# Patient Record
Sex: MALE | Race: WHITE | URBAN - METROPOLITAN AREA
[De-identification: names, ages, dates, MRNs, and addresses within clinical notes are randomized per-mention and may not be internally consistent; named-entity substitution may affect disease eponyms.]

---

## 2017-01-31 ENCOUNTER — CLINICAL DOCUMENTATION (OUTPATIENT)
Dept: FAMILY MEDICINE CLINIC | Age: 47
End: 2017-01-31

## 2017-01-31 RX ORDER — LORAZEPAM 0.5 MG/1
TABLET ORAL
Qty: 30 TABLET | Refills: 0 | Status: SHIPPED | OUTPATIENT
Start: 2017-01-31 | End: 2017-07-24 | Stop reason: SDUPTHER

## 2017-01-31 RX ORDER — RISPERIDONE 0.5 MG/1
TABLET, FILM COATED ORAL
Qty: 180 TABLET | Refills: 0 | Status: SHIPPED | OUTPATIENT
Start: 2017-01-31 | End: 2017-08-06 | Stop reason: SDUPTHER

## 2017-02-07 RX ORDER — TAMSULOSIN HYDROCHLORIDE 0.4 MG/1
0.4 CAPSULE ORAL DAILY
Qty: 30 CAPSULE | Refills: 3 | Status: SHIPPED | OUTPATIENT
Start: 2017-02-07 | End: 2017-08-31

## 2017-07-25 RX ORDER — LORAZEPAM 0.5 MG/1
0.5 TABLET ORAL DAILY PRN
Qty: 30 TABLET | Refills: 0 | Status: SHIPPED | OUTPATIENT
Start: 2017-07-25 | End: 2020-03-17

## 2017-08-08 RX ORDER — RISPERIDONE 0.5 MG/1
TABLET, FILM COATED ORAL
Qty: 180 TABLET | Refills: 0 | Status: SHIPPED | OUTPATIENT
Start: 2017-08-08 | End: 2018-01-08 | Stop reason: SDUPTHER

## 2017-08-31 ENCOUNTER — OFFICE VISIT (OUTPATIENT)
Dept: FAMILY MEDICINE CLINIC | Age: 47
End: 2017-08-31

## 2017-08-31 VITALS — HEART RATE: 80 BPM | SYSTOLIC BLOOD PRESSURE: 132 MMHG | TEMPERATURE: 98 F | DIASTOLIC BLOOD PRESSURE: 84 MMHG

## 2017-08-31 DIAGNOSIS — E03.9 ACQUIRED HYPOTHYROIDISM: ICD-10-CM

## 2017-08-31 DIAGNOSIS — R35.0 URINE FREQUENCY: ICD-10-CM

## 2017-08-31 DIAGNOSIS — E03.9 ACQUIRED HYPOTHYROIDISM: Primary | ICD-10-CM

## 2017-08-31 LAB
A/G RATIO: 1.6 (ref 1.1–2.2)
ALBUMIN SERPL-MCNC: 4.4 G/DL (ref 3.4–5)
ALP BLD-CCNC: 76 U/L (ref 40–129)
ALT SERPL-CCNC: 22 U/L (ref 10–40)
ANION GAP SERPL CALCULATED.3IONS-SCNC: 15 MMOL/L (ref 3–16)
AST SERPL-CCNC: 16 U/L (ref 15–37)
BILIRUB SERPL-MCNC: 0.4 MG/DL (ref 0–1)
BILIRUBIN URINE: NEGATIVE
BLOOD, URINE: NEGATIVE
BUN BLDV-MCNC: 11 MG/DL (ref 7–20)
CALCIUM SERPL-MCNC: 9.8 MG/DL (ref 8.3–10.6)
CHLORIDE BLD-SCNC: 101 MMOL/L (ref 99–110)
CLARITY: CLEAR
CO2: 24 MMOL/L (ref 21–32)
COLOR: YELLOW
CREAT SERPL-MCNC: 0.7 MG/DL (ref 0.9–1.3)
EPITHELIAL CELLS, UA: 1 /HPF (ref 0–5)
GFR AFRICAN AMERICAN: >60
GFR NON-AFRICAN AMERICAN: >60
GLOBULIN: 2.8 G/DL
GLUCOSE BLD-MCNC: 90 MG/DL (ref 70–99)
GLUCOSE URINE: NEGATIVE MG/DL
HCT VFR BLD CALC: 47.4 % (ref 40.5–52.5)
HEMOGLOBIN: 16.2 G/DL (ref 13.5–17.5)
HYALINE CASTS: 1 /LPF (ref 0–8)
KETONES, URINE: NEGATIVE MG/DL
LEUKOCYTE ESTERASE, URINE: NEGATIVE
MCH RBC QN AUTO: 29.7 PG (ref 26–34)
MCHC RBC AUTO-ENTMCNC: 34.1 G/DL (ref 31–36)
MCV RBC AUTO: 87.1 FL (ref 80–100)
MICROSCOPIC EXAMINATION: ABNORMAL
NITRITE, URINE: NEGATIVE
OTHER OBSERVATIONS UA: ABNORMAL
PDW BLD-RTO: 13.7 % (ref 12.4–15.4)
PH UA: 6
PLATELET # BLD: 190 K/UL (ref 135–450)
PMV BLD AUTO: 9.2 FL (ref 5–10.5)
POTASSIUM SERPL-SCNC: 4.4 MMOL/L (ref 3.5–5.1)
PROTEIN UA: NEGATIVE MG/DL
RBC # BLD: 5.44 M/UL (ref 4.2–5.9)
RBC UA: 3 /HPF (ref 0–4)
SODIUM BLD-SCNC: 140 MMOL/L (ref 136–145)
SPECIFIC GRAVITY UA: 1.02
T4 FREE: 1.2 NG/DL (ref 0.9–1.8)
TOTAL PROTEIN: 7.2 G/DL (ref 6.4–8.2)
TSH SERPL DL<=0.05 MIU/L-ACNC: 3.07 UIU/ML (ref 0.27–4.2)
UROBILINOGEN, URINE: 0.2 E.U./DL
WBC # BLD: 5.9 K/UL (ref 4–11)
WBC UA: 5 /HPF (ref 0–5)

## 2017-08-31 PROCEDURE — 99213 OFFICE O/P EST LOW 20 MIN: CPT | Performed by: FAMILY MEDICINE

## 2017-08-31 PROCEDURE — 81001 URINALYSIS AUTO W/SCOPE: CPT | Performed by: FAMILY MEDICINE

## 2017-08-31 RX ORDER — TAMSULOSIN HYDROCHLORIDE 0.4 MG/1
0.4 CAPSULE ORAL DAILY
Qty: 30 CAPSULE | Refills: 5 | Status: SHIPPED | OUTPATIENT
Start: 2017-08-31 | End: 2018-03-12 | Stop reason: SDUPTHER

## 2017-08-31 ASSESSMENT — ENCOUNTER SYMPTOMS
ABDOMINAL PAIN: 0
BLOOD IN STOOL: 0
NAUSEA: 0
DIARRHEA: 0
SHORTNESS OF BREATH: 0
VOMITING: 0

## 2017-09-02 LAB — URINE CULTURE, ROUTINE: NORMAL

## 2017-10-02 ENCOUNTER — OFFICE VISIT (OUTPATIENT)
Dept: FAMILY MEDICINE CLINIC | Age: 47
End: 2017-10-02

## 2017-10-02 VITALS — DIASTOLIC BLOOD PRESSURE: 82 MMHG | TEMPERATURE: 97.7 F | SYSTOLIC BLOOD PRESSURE: 116 MMHG

## 2017-10-02 DIAGNOSIS — E78.2 HYPERLIPIDEMIA, MIXED: ICD-10-CM

## 2017-10-02 DIAGNOSIS — R35.0 URINE FREQUENCY: ICD-10-CM

## 2017-10-02 DIAGNOSIS — E03.9 ACQUIRED HYPOTHYROIDISM: Primary | ICD-10-CM

## 2017-10-02 DIAGNOSIS — K13.0 PERLECHE: ICD-10-CM

## 2017-10-02 PROCEDURE — 99213 OFFICE O/P EST LOW 20 MIN: CPT | Performed by: FAMILY MEDICINE

## 2017-10-02 NOTE — PATIENT INSTRUCTIONS
Continue the same treatment for now  See the urology group for the urine problems  See ent for the continued irritation on the corners of the mouth  See me in 2 months

## 2017-10-02 NOTE — PROGRESS NOTES
Subjective:      Patient ID: Mekhi Reynolds is a 56 y.o. male. HPI Comments: Patient presents with:  1 Month Follow-Up    He is on the meds  He is not noting any improvement with the urine frequency  He is not using decongestants    YOB: 1970    Date of Visit:  10/2/2017    No Known Allergies    Current Outpatient Prescriptions:  tamsulosin (FLOMAX) 0.4 MG capsule, Take 1 capsule by mouth daily, Disp: 30 capsule, Rfl: 5  risperiDONE (RISPERDAL) 0.5 MG tablet, TAKE ONE TABLET BY MOUTH TWICE A DAY AS NEEDED, Disp: 180 tablet, Rfl: 0  LORazepam (ATIVAN) 0.5 MG tablet, Take 1 tablet by mouth daily as needed for Anxiety, Disp: 30 tablet, Rfl: 0  docusate sodium (COLACE) 100 MG capsule, Take 1 capsule by mouth 2 times daily as needed for Constipation, Disp: 30 capsule, Rfl: 0  fluticasone (FLONASE) 50 MCG/ACT nasal spray, 1 spray by Nasal route daily, Disp: 16 g, Rfl: 2  levothyroxine (LEVOTHROID) 25 MCG tablet, Take 1 tablet by mouth Daily, Disp: 30 tablet, Rfl: 3    No current facility-administered medications for this visit.       ---------------------------               10/02/17                      1111         ---------------------------   BP:          116/82         Site:       Left Arm        Position:     Sitting        Cuff Size:   Large Adult      Temp:   97.7 °F (36.5 °C)   TempSrc:      Oral         ---------------------------  There is no height or weight on file to calculate BMI. Wt Readings from Last 3 Encounters:  06/03/17 : 190 lb (86.2 kg)  08/13/15 : 185 lb (83.9 kg)  05/07/15 : 185 lb (83.9 kg)    BP Readings from Last 3 Encounters:  10/02/17 : 116/82  08/31/17 : 132/84  06/03/17 : 120/86        Review of Systems    Objective:   Physical Exam   Constitutional: He appears well-developed and well-nourished. No distress. Abdominal: Soft. Bowel sounds are normal. He exhibits no distension and no mass. There is no tenderness. There is no guarding.

## 2017-10-02 NOTE — MR AVS SNAPSHOT
fluticasone (FLONASE) 50 MCG/ACT nasal spray 1 spray by Nasal route daily    levothyroxine (LEVOTHROID) 25 MCG tablet Take 1 tablet by mouth Daily      Allergies           No Known Allergies         Additional Information        Basic Information     Date Of Birth Sex Race Ethnicity Preferred Language    1970 Male White Non-/Non  English      Problem List as of 10/2/2017  Date Reviewed: 6/7/2016                Acquired hypothyroidism    Hyperlipidemia, mixed    Personal history of traumatic brain injury    Allergic rhinitis    Anxiety      Immunizations as of 10/2/2017     Name Date    Influenza Whole 9/16/2014    Pneumococcal Polysaccharide ([id number]) 4/28/2005    Tdap (Boostrix, Adacel) 6/8/2009      Preventive Care        Date Due    HIV screening is recommended for all people regardless of risk factors  aged 15-65 years at least once (lifetime) who have never been HIV tested. 2/18/1986    Yearly Flu Vaccine (1) 9/1/2017    Tetanus Combination Vaccine (2 - Td) 6/8/2019    Cholesterol Screening 11/4/2020            MyChart Signup           Our records indicate that you have declined MyChart signup.

## 2017-12-18 RX ORDER — FLUTICASONE PROPIONATE 50 MCG
SPRAY, SUSPENSION (ML) NASAL
Qty: 16 G | Refills: 5 | Status: SHIPPED | OUTPATIENT
Start: 2017-12-18 | End: 2019-11-17 | Stop reason: SDUPTHER

## 2018-01-08 RX ORDER — RISPERIDONE 0.5 MG/1
TABLET, FILM COATED ORAL
Qty: 180 TABLET | Refills: 0 | Status: SHIPPED | OUTPATIENT
Start: 2018-01-08 | End: 2018-08-09 | Stop reason: SDUPTHER

## 2018-02-01 ENCOUNTER — OFFICE VISIT (OUTPATIENT)
Dept: FAMILY MEDICINE CLINIC | Age: 48
End: 2018-02-01

## 2018-02-01 VITALS — TEMPERATURE: 97.7 F | SYSTOLIC BLOOD PRESSURE: 128 MMHG | DIASTOLIC BLOOD PRESSURE: 80 MMHG

## 2018-02-01 DIAGNOSIS — Z87.820 PERSONAL HISTORY OF TRAUMATIC BRAIN INJURY: ICD-10-CM

## 2018-02-01 DIAGNOSIS — E03.9 ACQUIRED HYPOTHYROIDISM: ICD-10-CM

## 2018-02-01 DIAGNOSIS — E03.9 ACQUIRED HYPOTHYROIDISM: Primary | ICD-10-CM

## 2018-02-01 DIAGNOSIS — R35.0 URINE FREQUENCY: ICD-10-CM

## 2018-02-01 LAB — TSH SERPL DL<=0.05 MIU/L-ACNC: 2.58 UIU/ML (ref 0.27–4.2)

## 2018-02-01 PROCEDURE — 99213 OFFICE O/P EST LOW 20 MIN: CPT | Performed by: FAMILY MEDICINE

## 2018-02-01 RX ORDER — WHEELCHAIR
EACH MISCELLANEOUS
Qty: 1 EACH | Refills: 0 | Status: SHIPPED | OUTPATIENT
Start: 2018-02-01 | End: 2018-02-01 | Stop reason: CLARIF

## 2018-02-01 RX ORDER — WHEELCHAIR
EACH MISCELLANEOUS
Qty: 1 EACH | Refills: 0 | Status: SHIPPED | OUTPATIENT
Start: 2018-02-01 | End: 2019-04-02 | Stop reason: SDUPTHER

## 2018-02-16 PROBLEM — G81.11: Status: ACTIVE | Noted: 2018-02-16

## 2018-02-16 PROBLEM — I67.9: Status: ACTIVE | Noted: 2018-02-16

## 2018-02-21 ENCOUNTER — HOSPITAL ENCOUNTER (OUTPATIENT)
Dept: OTHER | Age: 48
Discharge: HOME | End: 2018-02-28
Attending: FAMILY MEDICINE | Admitting: FAMILY MEDICINE

## 2018-02-21 NOTE — PLAN OF CARE
Outpatient Physical Therapy  [] [facility]    Phone: [phone]   Fax: [phone]   [x] [facility]  Phone: [phone]              Fax: [phone]  [] [facility]   Phone: [phone]   Fax: [phone]     To: Dr. Rowena Dowling      Patient: Jose Maria Kim   : 1970   MRN: [id number]  Evaluation Date: 2018      Diagnosis Information:  · Diagnosis: Personal history of TBI   · Treatment Diagnosis: Impaired motor control, strength, and coordination in R LE and UE as well as balance deficits that affect functional mobility     Physical Therapy Certification Form  Dear Dr. Rowena Dowling,  The following patient has been evaluated for physical therapy services and for therapy to continue, Medicare requires monthly physician review of the treatment plan. Please review the attached evaluation and/or summary of the patient's plan of care, and verify that you agree therapy should continue by signing the attached document and sending it back to our office. Plan of Care/Treatment to date:  [x] Therapeutic Exercise    [x] Modalities:  [x] Therapeutic Activity     [] Ultrasound  [x] Electrical Stimulation  [x] Gait Training      [] Cervical Traction [] Lumbar Traction  [x] Neuromuscular Re-education    [] Cold/hotpack [] Iontophoresis   [x] Instruction in HEP     Other:  [x] Manual Therapy      []             [] Aquatic Therapy      []           ? Frequency/Duration:  # Days per week: [] 1 day # Weeks: [] 1 week [] 5 weeks     [x] 2 days? [] 2 weeks [] 6 weeks     [] 3 days   [] 3 weeks [] 7 weeks     [] 4 days   [] 4 weeks [x] 8 weeks    Rehab Potential: [] Excellent [] Good [x] Fair  [] Poor       Electronically signed by:  Nelly dominguez PT      If you have any questions or concerns, please don't hesitate to call.   Thank you for your referral.      Physician Signature:________________________________Date:__________________  By signing above, therapists plan is approved by physician

## 2018-02-21 NOTE — FLOWSHEET NOTE
Other Therapeutic Activities:    Discussed purpose, risks and benefits of PT with pt who is agreeable to POC. Home Exercise Program:    Instructed patient on an HEP. Patient demonstrated exercises correctly. Handout with pictures and # of reps/sets was given. Exercises are listed above. Manual Treatments:      Modalities:      Timed Code Treatment Minutes:  45    Total Treatment Minutes:  70    Treatment/Activity Tolerance:  [x] Patient tolerated treatment well [] Patient limited by fatigue  [] Patient limited by pain  [] Patient limited by other medical complications  [] Other:     Prognosis: [] Good [x] Fair  [] Poor    Patient Requires Follow-up: [x] Yes  [] No    Plan:   [] Continue per plan of care [] Alter current plan (see comments)  [x] Plan of care initiated [] Hold pending MD visit [] Discharge    Plan for Next Session:    Begin standing in // bars to work on balance, LE strength, and stamina.  Add standing exercises as able  Supine core strengthening exercises and functional exercises as tolerated  Initiate gait training once pt is able    Electronically signed by:  Ernesto Jerome, PT

## 2018-02-21 NOTE — PROGRESS NOTES
Physical Therapy  Initial Assessment  Date: 2018  Patient Name: Mirna Watts  MRN: [id number]  : 1970     Treatment Diagnosis: Impaired motor control, strength, and coordination in R LE and UE as well as balance deficits that affect functional mobility    Restrictions  Restrictions/Precautions  Restrictions/Precautions: Fall Risk (Mod-High)    Subjective   General  Chart Reviewed: Yes  Additional Pertinent Hx: PLOF- Pt required assistance for ADLs but was able to walk with on AD a few years ago  Family / Caregiver Present: Yes (sister and caregiver)  Referral Date : 18  Diagnosis: Personal history of TBI  PT Visit Information  Onset Date: 93  PT Insurance Information: [employer] Medicare- 40$ copay- Med. Nec. Subjective  Subjective: Pt had TBI in , was in coma for 6 months. He was walking initially but they told him he would regress. He was walking at home but he lives with his elderly parents and they could not help him when he falls. He has been falling recently due to his R leg swinging out. Pt       Vision/Hearing  Vision  Vision: Within Functional Limits  Hearing  Hearing: Within functional limits    Orientation  Orientation  Overall Orientation Status: Impaired  Orientation Level: Oriented to place;Oriented to person;Disoriented to time;Disoriented to situation (Pt has some deficits with judgement)    Social/Functional History     Objective     Observation/Palpation  Posture: Poor (Rounded trunk and lumbar kyphotic posture sitting in WC)  Observation: Pt has increased spasticity and tone in R LE. Trouble controlling movement in [address] St[address] in sitting and standing.      AROM RLE (degrees)  RLE General AROM: AROM is mostly normal but pt has poor motor control  AROM LLE (degrees)  LLE AROM : WNL  AROM RUE (degrees)  RUE General AROM: AROM is almost normal in all directions but motor control is limited  AROM LUE (degrees)  LUE AROM : WNL    Strength RLE  Comment: Grossly 3+/5 in R hip and knee except HS 5/5  Strength LLE  Strength LLE: WNL  Strength RUE  Strength RUE: WNL  Strength LUE  Strength LUE: WNL        Sensation  Overall Sensation Status: WNL        Ambulation  Ambulation?: Yes  Ambulation 1  Surface: level tile  Device: Parallel Bars  Assistance: Minimal assistance;Contact guard assistance  Quality of Gait: Slow, uncoordinated gait pattern. R lean with rotation and used // bars heavily for balance  Distance: 5 steps  Comments: Pt took small uncoordinated steps very slowly in // bars. Balance  Sitting - Static: Good  Sitting - Dynamic: Fair;+  Standing - Static: Fair;- (MIN A at times to correct standing balance)  Standing - Dynamic: Poor;+  Comments: DLB with feet together: pt could balance x 2 minutes needing MIN A at times                         Assessment   Conditions Requiring Skilled Therapeutic Intervention  Body structures, Functions, Activity limitations: Decreased functional mobility ; Decreased ADL status; Decreased strength;Decreased endurance;Decreased cognition;Decreased safe awareness;Decreased balance;Decreased high-level IADLs;Decreased fine motor control;Decreased coordination  Treatment Diagnosis: Impaired motor control, strength, and coordination in R LE and UE as well as balance deficits that affect functional mobility  Prognosis: Fair  Decision Making: High Complexity  REQUIRES PT FOLLOW UP: Yes  Activity Tolerance  Activity Tolerance: Patient limited by fatigue;Patient Tolerated treatment well;Patient limited by endurance         Plan   Plan  Times per week: 2  Times per day: Daily  Plan weeks: 6  Current Treatment Recommendations: Strengthening, ROM, Balance Training, Functional Mobility Training, Gait Training, Neuromuscular Re-education, Manual Therapy - Joint Manipulation, Home Exercise Program, Positioning, ADL/Self-care Training    G-Code  PT G-Codes  Functional Assessment Tool Used: PT assessment  Functional Limitation: Mobility: Walking and moving around  Mobility: Walking and Moving Around Current Status (): At least 60 percent but less than 80 percent impaired, limited or restricted  Mobility: Walking and Moving Around Goal Status ():  At least 20 percent but less than 40 percent impaired, limited or restricted    OutComes Score                                           Goals  Short term goals  Time Frame for Short term goals: 2 weeks  Short term goal 1: Pt and caregiver will show independence in HEP  Short term goal 2: Pt will show 2 minutes of standing balance with CGA  Long term goals  Time Frame for Long term goals : 6 weeks  Long term goal 1: Pt will perform stand pivot transfer with SBA  Long term goal 2: Pt will ambulate 10 feet with RW and SBA  Long term goal 3: Pt will show good unsupported sitting balance x 5 minutes to be able to complete ADLs  Patient Goals   Patient goals : Pt's caregivers state \"they would like him to improve transfers, core strength for bladder control, Speech, and ADLs\"       Therapy Time   Individual Concurrent Group Co-treatment   Time In           Time Out           Minutes                   Nelly dominguez, PT

## 2018-03-01 ENCOUNTER — HOSPITAL ENCOUNTER (OUTPATIENT)
Dept: OTHER | Age: 48
Discharge: HOME | End: 2018-03-31
Attending: FAMILY MEDICINE | Admitting: FAMILY MEDICINE

## 2018-03-02 ENCOUNTER — HOSPITAL ENCOUNTER (OUTPATIENT)
Dept: OTHER | Age: 48
Discharge: HOME | End: 2018-03-09
Attending: FAMILY MEDICINE | Admitting: FAMILY MEDICINE

## 2018-03-07 ENCOUNTER — HOSPITAL ENCOUNTER (OUTPATIENT)
Dept: PHYSICAL THERAPY | Age: 48
Discharge: HOME | End: 2018-03-08
Admitting: FAMILY MEDICINE

## 2018-03-07 NOTE — FLOWSHEET NOTE
Physical Therapy Daily Treatment Note  Date:  3/7/2018    Patient Name:  Jaclyn Malone    :  1970  MRN: [id number]  Restrictions/Precautions:  TBI- - Impaired judgement and weakness/poor coordination in R UE and LE  Pertinent Medical History:  Medical/Treatment Diagnosis Information:  · Diagnosis: Personal history of TBI  · Treatment Diagnosis: Impaired motor control, strength, and coordination in R LE and UE as well as balance deficits that affect functional mobility  Insurance/Certification information:  PT Insurance Information: [place] Medicare- 40$ copay- Med. Nec. Physician Information:  Dr. Alina Benitez of care signed (Y/N):    Visit# / total visits:    Pain level: 0/10     G-Code (if applicable):      Date / Visit # G-Code Applied:  18  PT G-Codes  Functional Assessment Tool Used: PT assessment  Functional Limitation: Mobility: Walking and moving around  Mobility: Walking and Moving Around Current Status (): At least 60 percent but less than 80 percent impaired, limited or restricted  Mobility: Walking and Moving Around Goal Status (): At least 20 percent but less than 40 percent impaired, limited or restricted    Progress Note: []  Yes  []  No  Next due by: Visit #10      History of Injury:  Pt had TBI in , was in coma for 6 months. He was walking initially but they told him he would regress. He was walking at home but he lives with his elderly parents and they could not help him when he falls. He has been falling recently due to his R leg swinging out. The caregivers are hoping he can improve his strength and endurance, as well as core strength to help with bladder control. Subjective:       Objective:  Observation:   Test measurements:      Exercises:  Exercise/Equipment Resistance/Repetitions Other comments   Nu Step  x 5 min  Level 7 Verbal and manual cueing to slow pace and help keep left foot on the pedal   // Bars Standing 2 x 5 min.  Worked on lateral

## 2018-03-12 ENCOUNTER — TELEPHONE (OUTPATIENT)
Dept: FAMILY MEDICINE CLINIC | Age: 48
End: 2018-03-12

## 2018-03-12 DIAGNOSIS — K11.7 DROOLING: ICD-10-CM

## 2018-03-12 DIAGNOSIS — K13.0 PERLECHE: Primary | ICD-10-CM

## 2018-03-12 RX ORDER — TAMSULOSIN HYDROCHLORIDE 0.4 MG/1
CAPSULE ORAL
Qty: 30 CAPSULE | Refills: 5 | Status: SHIPPED | OUTPATIENT
Start: 2018-03-12 | End: 2018-10-08 | Stop reason: SDUPTHER

## 2018-03-12 NOTE — TELEPHONE ENCOUNTER
----- Message from Nicko Moses MD sent at 3/12/2018  1:20 PM EDT -----  This fellow needs to see ent for evaluation and recommend what can be done to help with chronic perleche and drooling

## 2018-03-14 ENCOUNTER — HOSPITAL ENCOUNTER (OUTPATIENT)
Dept: PHYSICAL THERAPY | Age: 48
Discharge: HOME | End: 2018-03-15
Admitting: FAMILY MEDICINE

## 2018-03-21 ENCOUNTER — HOSPITAL ENCOUNTER (OUTPATIENT)
Dept: PHYSICAL THERAPY | Age: 48
Discharge: HOME | End: 2018-03-22
Admitting: FAMILY MEDICINE

## 2018-03-21 NOTE — FLOWSHEET NOTE
Physical Therapy Daily Treatment Note  Date:  3/21/2018    Patient Name:  Lana Vallejo    :  1970  MRN: [id number]  Restrictions/Precautions:  TBI- - Impaired judgement and weakness/poor coordination in R UE and LE  Pertinent Medical History:  Medical/Treatment Diagnosis Information:  · Diagnosis: Personal history of TBI  · Treatment Diagnosis: Impaired motor control, strength, and coordination in R LE and UE as well as balance deficits that affect functional mobility  Insurance/Certification information:  PT Insurance Information: [place] Medicare- 40$ copay- Med. Nec. Physician Information:  Dr. Bueno Room of care signed (Y/N):    Visit# / total visits:   (3/8 insurance)  Pain level: 0/10     G-Code (if applicable):      Date / Visit # G-Code Applied:  18  PT G-Codes  Functional Assessment Tool Used: PT assessment  Functional Limitation: Mobility: Walking and moving around  Mobility: Walking and Moving Around Current Status (): At least 60 percent but less than 80 percent impaired, limited or restricted  Mobility: Walking and Moving Around Goal Status (): At least 20 percent but less than 40 percent impaired, limited or restricted    Progress Note: []  Yes  []  No  Next due by: Visit #10      History of Injury:  Pt had TBI in , was in coma for 6 months. He was walking initially but they told him he would regress. He was walking at home but he lives with his elderly parents and they could not help him when he falls. He has been falling recently due to his R leg swinging out. The caregivers are hoping he can improve his strength and endurance, as well as core strength to help with bladder control.     Subjective:      3/14/18: Pt states he was not sore after last session  3/21/18: Pt states he is doing good    Objective:  Observation:   Test measurements:      Exercises:  Exercise/Equipment Resistance/Repetitions Other comments   Nu Step  x 6 min  Level 8 Verbal and manual

## 2018-03-28 ENCOUNTER — HOSPITAL ENCOUNTER (OUTPATIENT)
Dept: PHYSICAL THERAPY | Age: 48
Discharge: HOME | End: 2018-03-29
Admitting: FAMILY MEDICINE

## 2018-03-28 NOTE — FLOWSHEET NOTE
Physical Therapy Daily Treatment Note  Date:  3/28/2018    Patient Name:  Tameka Sethi    :  1970  MRN: [id number]  Restrictions/Precautions:  TBI- - Impaired judgement and weakness/poor coordination in R UE and LE  Pertinent Medical History:  Medical/Treatment Diagnosis Information:  · Diagnosis: Personal history of TBI  · Treatment Diagnosis: Impaired motor control, strength, and coordination in R LE and UE as well as balance deficits that affect functional mobility  Insurance/Certification information:  PT Insurance Information: [place] Medicare- 40$ copay- Med. Nec. Physician Information:  Dr. Louise Pan of care signed (Y/N):    Visit# / total visits:   ( insurance)  Pain level: 0/10     G-Code (if applicable):      Date / Visit # G-Code Applied:  18  PT G-Codes  Functional Assessment Tool Used: PT assessment  Functional Limitation: Mobility: Walking and moving around  Mobility: Walking and Moving Around Current Status (): At least 60 percent but less than 80 percent impaired, limited or restricted  Mobility: Walking and Moving Around Goal Status (): At least 20 percent but less than 40 percent impaired, limited or restricted    Progress Note: []  Yes  []  No  Next due by: Visit #10      History of Injury:  Pt had TBI in , was in coma for 6 months. He was walking initially but they told him he would regress. He was walking at home but he lives with his elderly parents and they could not help him when he falls. He has been falling recently due to his R leg swinging out. The caregivers are hoping he can improve his strength and endurance, as well as core strength to help with bladder control.     Subjective:      3/14/18: Pt states he was not sore after last session  3/21/18: Pt states he is doing good  3/28/18: Pt states he is doing good    Objective:  Observation:   Test measurements:      Exercises:  Exercise/Equipment Resistance/Repetitions Other comments   Nu Step

## 2018-04-01 ENCOUNTER — HOSPITAL ENCOUNTER (OUTPATIENT)
Dept: OTHER | Age: 48
Discharge: HOME | End: 2018-04-30
Attending: FAMILY MEDICINE | Admitting: FAMILY MEDICINE

## 2018-04-11 ENCOUNTER — HOSPITAL ENCOUNTER (OUTPATIENT)
Dept: PHYSICAL THERAPY | Age: 48
Discharge: HOME | End: 2018-04-12
Admitting: FAMILY MEDICINE

## 2018-04-11 NOTE — FLOWSHEET NOTE
measurements:      Exercises:  Exercise/Equipment Resistance/Repetitions Other comments   Nu Step  x 6 min  Level 7 Verbal and manual cueing to slow pace and help keep left foot on the pedal   // Bars Standing 3 x 5 min. Worked on lateral weight shifts, posture, and mini squats during standing trials  Sit to stand transfer x 5  With verbal and manual cuing for techinique  Ambulated 2 lap in // bars with CGA to MOD A at times. (W/C follow)    LAQ     Seated PPT 10 x 10\" holds (5 with ball squeeze) Verbal and tactile cuing                       HEP      Seated Alternating LAQ X 10 B 2/22   Seated posture training and PPT 3 x 30\" 2/22                    Other Therapeutic Activities:        Home Exercise Program:        Manual Treatments:      Modalities:      Timed Code Treatment Minutes:  50  Total Treatment Minutes:  50    Treatment/Activity Tolerance:  [x] Patient tolerated treatment well [] Patient limited by fatigue  [] Patient limited by pain  [] Patient limited by other medical complications  [] Other:     Prognosis: [] Good [x] Fair  [] Poor    Patient Requires Follow-up: [x] Yes  [] No    Plan:   [x] Continue per plan of care [] Alter current plan (see comments)  [] Plan of care initiated [] Hold pending MD visit [] Discharge    Plan for Next Session:    Begin standing in // bars to work on balance, LE strength, and stamina.  Add standing exercises as able  Supine core strengthening exercises and functional exercises as tolerated  Initiate gait training once pt is able    Electronically signed by:  Jodi Peter PT

## 2018-04-25 ENCOUNTER — HOSPITAL ENCOUNTER (OUTPATIENT)
Dept: PHYSICAL THERAPY | Age: 48
Discharge: HOME | End: 2018-04-26
Admitting: FAMILY MEDICINE

## 2018-04-25 NOTE — FLOWSHEET NOTE
Physical Therapy Daily Treatment Note  Date:  2018    Patient Name:  Becca Ca    :  1970  MRN: [id number]  Restrictions/Precautions:  TBI- - Impaired judgement and weakness/poor coordination in R UE and LE  Pertinent Medical History:  Medical/Treatment Diagnosis Information:  · Diagnosis: Personal history of TBI  · Treatment Diagnosis: Impaired motor control, strength, and coordination in R LE and UE as well as balance deficits that affect functional mobility  Insurance/Certification information:  PT Insurance Information: [place] Medicare- 40$ copay- Med. Nec. Physician Information:  Dr. Danya Roberts of care signed (Y/N):    Visit# / total visits:   (+ 1/4 insurance)  Pain level: 0/10     G-Code (if applicable):      Date / Visit # G-Code Applied:  18  PT G-Codes  Functional Assessment Tool Used: PT assessment  Functional Limitation: Mobility: Walking and moving around  Mobility: Walking and Moving Around Current Status (): At least 60 percent but less than 80 percent impaired, limited or restricted  Mobility: Walking and Moving Around Goal Status (): At least 20 percent but less than 40 percent impaired, limited or restricted    Progress Note: []  Yes  []  No  Next due by: Visit #10      History of Injury:  Pt had TBI in , was in coma for 6 months. He was walking initially but they told him he would regress. He was walking at home but he lives with his elderly parents and they could not help him when he falls. He has been falling recently due to his R leg swinging out. The caregivers are hoping he can improve his strength and endurance, as well as core strength to help with bladder control. Subjective:      3/14/18: Pt states he was not sore after last session  3/21/18: Pt states he is doing good  18: Pt states he is doing good  18: Pt states he is doing good.  Caregiver states confidence with transfers has improved  18: Pt states he is doing

## 2018-05-01 ENCOUNTER — HOSPITAL ENCOUNTER (OUTPATIENT)
Dept: OTHER | Age: 48
Discharge: HOME | End: 2018-05-31
Attending: FAMILY MEDICINE | Admitting: FAMILY MEDICINE

## 2018-05-02 ENCOUNTER — HOSPITAL ENCOUNTER (OUTPATIENT)
Dept: PHYSICAL THERAPY | Age: 48
Discharge: HOME | End: 2018-05-03
Admitting: FAMILY MEDICINE

## 2018-05-02 NOTE — FLOWSHEET NOTE
Physical Therapy Daily Treatment Note  Date:  2018    Patient Name:  Vivian Ogden    :  1970  MRN: [id number]  Restrictions/Precautions:  TBI- - Impaired judgement and weakness/poor coordination in R UE and LE  Pertinent Medical History:  Medical/Treatment Diagnosis Information:  · Diagnosis: Personal history of TBI  · Treatment Diagnosis: Impaired motor control, strength, and coordination in R LE and UE as well as balance deficits that affect functional mobility  Insurance/Certification information:  PT Insurance Information: [place] Medicare- 40$ copay- Med. Nec. Physician Information:  Dr. Jimbo Rizzo of care signed (Y/N):    Visit# / total visits:  10/12 (+ 2/4 insurance)  Pain level: 0/10     G-Code (if applicable):      Date / Visit # G-Code Applied:  18  PT G-Codes  Functional Assessment Tool Used: PT assessment  Functional Limitation: Mobility: Walking and moving around  Mobility: Walking and Moving Around Current Status (): At least 60 percent but less than 80 percent impaired, limited or restricted  Mobility: Walking and Moving Around Goal Status (): At least 20 percent but less than 40 percent impaired, limited or restricted    Progress Note: []  Yes  []  No  Next due by: Visit #10      History of Injury:  Pt had TBI in , was in coma for 6 months. He was walking initially but they told him he would regress. He was walking at home but he lives with his elderly parents and they could not help him when he falls. He has been falling recently due to his R leg swinging out. The caregivers are hoping he can improve his strength and endurance, as well as core strength to help with bladder control. Subjective:      3/14/18: Pt states he was not sore after last session  3/21/18: Pt states he is doing good  18: Pt states he is doing good  18: Pt states he is doing good.  Caregiver states confidence with transfers has improved  18: Pt states he is doing

## 2018-05-09 ENCOUNTER — HOSPITAL ENCOUNTER (OUTPATIENT)
Dept: PHYSICAL THERAPY | Age: 48
Discharge: HOME | End: 2018-05-10
Admitting: FAMILY MEDICINE

## 2018-05-16 ENCOUNTER — HOSPITAL ENCOUNTER (OUTPATIENT)
Dept: PHYSICAL THERAPY | Age: 48
Discharge: HOME | End: 2018-05-17
Admitting: FAMILY MEDICINE

## 2018-05-16 NOTE — FLOWSHEET NOTE
Physical Therapy Daily Treatment Note  Date:  2018    Patient Name:  Reggie Lizarraga    :  1970  MRN: [id number]  Restrictions/Precautions:  TBI- - Impaired judgement and weakness/poor coordination in R UE and LE  Pertinent Medical History:  Medical/Treatment Diagnosis Information:  · Diagnosis: Personal history of TBI  · Treatment Diagnosis: Impaired motor control, strength, and coordination in R LE and UE as well as balance deficits that affect functional mobility  Insurance/Certification information:  PT Insurance Information: [place] [employer]- 40$ copay- Med. Nec. Physician Information:  Dr. Rafaela Saldaña of care signed (Y/N):    Visit# / total visits:   (+ 4/4 insurance)  Pain level: 0/10     G-Code (if applicable):      Date / Visit # G-Code Applied:  18  PT G-Codes  Functional Assessment Tool Used: PT assessment  Functional Limitation: Mobility: Walking and moving around  Mobility: Walking and Moving Around Current Status (): At least 60 percent but less than 80 percent impaired, limited or restricted  Mobility: Walking and Moving Around Goal Status (): At least 20 percent but less than 40 percent impaired, limited or restricted    Progress Note: []  Yes  []  No  Next due by: Visit #10      History of Injury:  Pt had TBI in , was in coma for 6 months. He was walking initially but they told him he would regress. He was walking at home but he lives with his elderly parents and they could not help him when he falls. He has been falling recently due to his R leg swinging out. The caregivers are hoping he can improve his strength and endurance, as well as core strength to help with bladder control. Subjective:      3/14/18: Pt states he was not sore after last session  3/21/18: Pt states he is doing good  18: Pt states he is doing good  18: Pt states he is doing good.  Caregiver states confidence with transfers has improved  18: Pt states he is doing

## 2018-05-18 DIAGNOSIS — G81.11 SPASTIC HEMIPLEGIA OF RIGHT DOMINANT SIDE DUE TO OTHER CEREBROVASCULAR DISEASE: Primary | ICD-10-CM

## 2018-05-18 DIAGNOSIS — Z87.820 PERSONAL HISTORY OF TRAUMATIC BRAIN INJURY: ICD-10-CM

## 2018-05-18 DIAGNOSIS — I67.89 SPASTIC HEMIPLEGIA OF RIGHT DOMINANT SIDE DUE TO OTHER CEREBROVASCULAR DISEASE: Primary | ICD-10-CM

## 2018-05-18 PROBLEM — G81.90 HEMIPLEGIA (HCC): Status: ACTIVE | Noted: 2018-05-18

## 2018-05-23 ENCOUNTER — HOSPITAL ENCOUNTER (OUTPATIENT)
Dept: OCCUPATIONAL THERAPY | Age: 48
Discharge: HOME | End: 2018-05-24
Admitting: FAMILY MEDICINE

## 2018-05-23 NOTE — PROGRESS NOTES
Occupational Therapy  Occupational Therapy Initial Assessment  Date:  2018    Patient Name: Diamond Whyte  MRN: [id number]     :  1970          Restrictions  Restrictions/Precautions  Restrictions/Precautions: Fall Risk     Subjective   General  Chart Reviewed: Yes  Patient assessed for rehabilitation services?: Yes  Additional Pertinent Hx: Pt is a 53 y.o. male referred to OP OT after sustaining a TBI in . This resulted in R sided deficits. PMH includes: Anxiety. Family / Caregiver Present: Yes (caregiver, Shama Gan)  Referring Practitioner: Taylor Anderson  Diagnosis: H61.930  OT Visit Information  Onset Date: 18  OT Insurance Information: [employer]  Subjective  Subjective: Pt met in OP therapy gym for OT evaluation. Caregiver, Shama Gan, present. Pt pleasant and agreeable to therapy. Denies pain. Pain Assessment  Patient Currently in Pain: Denies    Home Living  Social/Functional History  Additional Comments: Pt has / caregiver assistance. He requires assist to don tshirt but is able to assist in donning shirt, he requires total A for LB dressing, bathing, and toileting. Pt is able to feed and groom self. He requires assist to propel manual w/c. Objective   Tone RUE  RUE Tone: Hypertonic  RUE Modified [place] Scale  RUE Modified [place] Scale Completed: Yes  RUE Modified [place] Scale  RUE Bicep Score: 3  RUE Tricep Score: 3    Tone LUE  LUE Tone: Normotonic    Coordination  Movements Are Fluid And Coordinated: No  Coordination and Movement description: Fine motor impairments;Gross motor impairments; Ataxia; Decreased speed;Decreased accuracy; Right UE     Cognition  Overall Cognitive Status: Exceptions  Cognition Comment: Slurred speech, difficult to understand at times. Perseverates on phrases/ideas at times. Caregiver reports they hope to begin speech therapy soon.      Sensation  Overall Sensation Status: WFL     LUE AROM (degrees)  LUE AROM : WFL  RUE AROM (degrees)  RUE Strengthening, ROM, Functional Mobility Training, Wheelchair Mobility Training, Safety Education & Training, Self-Care / ADL, Positioning, Neuromuscular Re-education, Equipment Evaluation, Education, & procurement, Patient/Caregiver Education & Training    G-Code  OT G-codes  Functional Assessment Tool Used: quick dash  Score: 43  Functional Limitation: Self care  Self Care Current Status (): At least 60 percent but less than 80 percent impaired, limited or restricted  Self Care Goal Status (): At least 40 percent but less than 60 percent impaired, limited or restricted  OutComes Score  QuickDASH Total Score: 43       QuickDASH Disability/Symptom Score : 72.73 %                 Goals  Short term goals  Time Frame for Short term goals:  In 10-12 weeks:  Short term goal 1: Pt will be independent with urinal use from seated level  Short term goal 2: Pt will complete fxl mobility from w/c level at supv to be able to get self to/from bathroom  Short term goal 3: Pt will participate in UE HEP with caregiver assistance  Short term goal 4: Pt will participate in adaptive equipment trials and recommendations for ADL tasks  Patient Goals   Patient goals : \"to be able to self-propel w/c household distances and to use urinal independently\"       Therapy Time   Individual Concurrent Group Co-treatment   Time In 0945         Time Out 1030         Minutes 45         Timed Code Treatment Minutes: [address] [facility] [address] [place], OTR/L 1992

## 2018-05-23 NOTE — PROGRESS NOTES
Occupational Therapy       Outpatient Occupational Therapy  [] [facility] DR YARELI ARREDONDO   Phone: [phone]   Fax: [phone]   [x] [facility]  Phone: [phone]   Fax: [phone]  [] [place]   Phone: [phone]   Fax: [phone]      To: Referring Practitioner: Sonia Kapadiasoniabassam      Patient: Dilma Kirklnad  : 1970  MRN: [id number]  Evaluation Date: 2018      Diagnosis Information:  Diagnosis: Z87.820   OT Insurance Information: [employer] [place]  Dear Dr. Nura Cherry,  The following patient has been evaluated for occupational therapy services and for therapy to continue, Medicare requires monthly physician review of the treatment plan. Please review the attached evaluation and/or summary of the patient's plan of care, and verify that you agree therapy should continue by signing the attached document and sending it back to our office. Plan of Care/Treatment to date:  [x] Therapeutic Exercise   [] Modalities:  [x] Therapeutic Activity    [] Ultrasound  [] Electrical Stimulation   [x] Activities of Daily Living    [] Fluidotherapy [] Kinesiotaping  [x] Neuromuscular Re-education   [] Iontophoresis [] Coldpack/hotpack   [x] Instruction in HEP     [] Contrast Bath  [] Manual Therapy     Other:  [] Aquatic Therapy      [] ? Frequency/Duration:  # Days per week: [x] 1 day # Weeks: [] 1 week [] 5 weeks      [] 2 days? [] 2 weeks [] 6 weeks     [] 3 days   [] 3 weeks [] 7 weeks     [] 4 days   [] 4 weeks [x] 12 weeks    Rehab Potential: [] excellent [x] good [x] fair  [] poor       Electronically signed by:  Immanuel Perez [id number]      If you have any questions or concerns, please don't hesitate to call.   Thank you for your referral.      Physician Signature:________________________________Date:__________________  By signing above, therapists plan is approved by physician

## 2018-05-30 ENCOUNTER — HOSPITAL ENCOUNTER (OUTPATIENT)
Dept: OCCUPATIONAL THERAPY | Age: 48
Discharge: HOME | End: 2018-06-02
Admitting: FAMILY MEDICINE

## 2018-05-30 NOTE — PROGRESS NOTES
ROM, difficult to gather accurate measurements. Elbow flex/ext appeared functional, shoulder flexion appeared ~70*  R Shoulder ABduction 0-180: 66*  Right Hand AROM (degrees)  Right Hand General AROM: Fourth and fifth digits in flexed position but pt is able to extend fully, unable to fully extend from MCP joints. Wrist flexion/extension WFL    LUE Strength  Gross LUE Strength: Exceptions to [facility] (grossly 2/5 throughout)    RUE Strength  Gross RUE Strength: WFL                             Therapeutic Activities:  Wellocities: Pt completed cognitive and fine motor task with R UE in order to complete toy car puzzle. Pt required assist to stabilize elbow to assist in control of R hand when moving cars. Pt had mod difficulty completing this activity due to ataxia and hypertonicity in R UE. Pt practiced picking up medium-sized balls with L hand, transferred to R hand, and then placed in tall cornelius placed on table. Pt able to complete with six balls. Pt required support at elbow from therapist to stabilize. Occasionally dropped balls and required assist from therapist to  and grasp in R hand. Pt had mod difficulty completing this task. Created cone splint for pt's R hand to assist in high tone to R hand and fingers. Instructed caregiver on wear schedule and will plan to re-assess splint at future session. Pt practiced grasping urinal with L hand and placing it in correct position over clothes. Discussed techniques, such as weight shifting, to practice at home in order to assist pt in completing toileting more independently. Functional wheelchair mobility throughout OP gym. Pt used holly LEs to self-propel. Also able to partially use L UE. Pt had mod difficulty during turns, but appeared to do well in a straight path. He required assist to get over tile to carpet threshold (this is the same eduar as into his bathroom at home). Pt able to self-propel about 15' with increased time.  Fatigued after this

## 2018-06-01 ENCOUNTER — HOSPITAL ENCOUNTER (OUTPATIENT)
Dept: OTHER | Age: 48
Discharge: HOME | End: 2018-06-30
Attending: FAMILY MEDICINE | Admitting: FAMILY MEDICINE

## 2018-06-01 NOTE — PROGRESS NOTES
Outpatient Physical Therapy  [] [facility]    Phone: [phone]   Fax: [phone]   [x] [facility]  Phone: [phone]   Fax: [phone]  [] [facility]              Phone: [phone]   Fax: [phone]     Physical Therapy Discharge Note  Date: 2018        Patient Name:  Huang Winn    :  1970  MRN: [id number]  Restrictions/Precautions:  TBI- - Impaired judgement and weakness/poor coordination in R UE and LE  Pertinent Medical History:  Medical/Treatment Diagnosis Information:  · Diagnosis: Personal history of TBI  · Treatment Diagnosis: Impaired motor control, strength, and coordination in R LE and UE as well as balance deficits that affect functional mobility  Insurance/Certification information:  PT Insurance Information: [employer] Medicare- 40$ copay- Med. Nec. Physician Information:  Dr. Swati Mata of care signed (Y/N):    Visit# / total visits:   (+ 4/ insurance)  Pain level:      0/10      G-Code (if applicable):      Date G-Code Applied:  18  PT G-Codes  Functional Assessment Tool Used: PT assessment  Functional Limitation: Mobility: Walking and moving around  Mobility: Walking and Moving Around Goal Status (): At least 20 percent but less than 40 percent impaired, limited or restricted  Mobility: Walking and Moving Around Discharge Status (): At least 60 percent but less than 80 percent impaired, limited or restricted     Time Period for Report:   to 18  Cancels/No-shows to date:   0    Plan of Care/Treatment to date:  [x] Therapeutic Exercise    [] Modalities:  [x] Therapeutic Activity     [] Ultrasound  [] Electrical Stimulation  [x] Gait Training      [] Cervical Traction    [] Lumbar Traction  [x] Neuromuscular Re-education  [] Cold/hotpack [] Iontophoresis  [x] Instruction in HEP      Other:  [] Manual Therapy       []    [] Aquatic Therapy       []                    ?       Significant Findings At Last Visit/Comments: Assessment:  Summary:  Pt made some progress towards all goals. Pt improved standing balance, standing tolerance, and ability to ambulate in // bars with less assistance. Pt improved ability to transfer with less assistance and better control of B LE. Pt now discharged  Patient's response to treatment: Good    Progress towards goals:    Time Frame for Short term goals: 2 weeks  Short term goal 1: Pt and caregiver will show independence in HEP- MET  Short term goal 2: Pt will show 2 minutes of standing balance with CGA- MET  Long term goals  Time Frame for Long term goals : 6 weeks  Long term goal 1: Pt will perform stand pivot transfer with SBA- NOT MET- CGA at times but mostly MIN A  Long term goal 2: Pt will ambulate 10 feet with RW and SBA- NOT MET- 10 feet in // bars with CGA/SBA  Long term goal 3: Pt will show good unsupported sitting balance x 5 minutes to be able to complete ADLs- PART MET  Patient Goals   Patient goals : Pt's caregivers state \"they would like him to improve transfers, core strength for bladder control, Speech, and ADLs\"- PART MET  Rehab Potential: [] Excellent [x] Good [x] Fair  [] Poor     Goal Status:  [] Achieved [x] Partially Achieved  [] Not Achieved     Patient Status: [] Continue per initial plan of Care     [x] Patient now discharged     [] Additional visits requested, Please re-certify for additional visits:      Requested frequency/duration:  X/week for weeks    Electronically signed by:  Nelly dominguez PT    If you have any questions or concerns, please don't hesitate to call.   Thank you for your referral.    Physician Signature:________________________________Date:__________________  By signing above, therapists plan is approved by physician

## 2018-06-06 ENCOUNTER — HOSPITAL ENCOUNTER (OUTPATIENT)
Dept: OCCUPATIONAL THERAPY | Age: 48
Discharge: HOME | End: 2018-06-07
Admitting: FAMILY MEDICINE

## 2018-06-06 NOTE — PROGRESS NOTES
OB Progress Note: Primary  Delivery, POD#1    S: 37yo  POD#1 s/p pLTCS . Her pain is well controlled. She is tolerating a regular diet and passing flatus. Denies N/V. Denies CP/SOB/lightheadedness/dizziness.   She is ambulating without difficulty. Indwelling catheter was removed this AM- patient is due to void. Bandage removed.     O:   Vital Signs Last 24 Hrs  T(C): 37.1 (12 Aug 2017 05:00), Max: 37.1 (12 Aug 2017 05:00)  T(F): 98.8 (12 Aug 2017 05:00), Max: 98.8 (12 Aug 2017 05:00)  HR: 76 (12 Aug 2017 05:00) (58 - 81)  BP: 98/65 (12 Aug 2017 05:00) (94/58 - 124/70)  BP(mean): --  RR: 18 (12 Aug 2017 05:00) (16 - 18)  SpO2: 99% (11 Aug 2017 21:36) (98% - 100%)    Labs:  Blood type: A Positive  Rubella IgG: RPR: Negative                          13.9   13.1<H> >-----------< 201    ( 10 @ 15:00 )             39.7                  PE:  General: NAD  Abdomen: Mildly distended, appropriately tender, incision c/d/i.  Extremities: No erythema, no pitting edema        Radha Bridges, PGY-1 management). · 6/6: Pt provided with 4 written exercises this date to perform with caregiver. Instructed caregiver to have pt attempt as much wheelchair fxl mobility as possible in his home.       Timed Code Treatment Minutes:  45    Total Treatment Minutes:  45    Treatment/Activity Tolerance:     [x]  Patient tolerated treatment well []  Patient limited by fatigue    []  Patient limited by pain []  Patient limited by other medical complications   []  Other:     Prognosis: []  Good [x]  Fair  []  Poor    Patient Requires Follow-up:  [x]  Yes  []  No    Plan: [x]  Continue per plan of care []  Alter current plan (see comments)   []  Plan of care initiated []  Hold pending MD visit []  Discharge    Electronically signed by:  Vilma Guy [id number]

## 2018-06-13 ENCOUNTER — HOSPITAL ENCOUNTER (OUTPATIENT)
Dept: OCCUPATIONAL THERAPY | Age: 48
Discharge: HOME | End: 2018-06-14
Admitting: FAMILY MEDICINE

## 2018-06-20 ENCOUNTER — HOSPITAL ENCOUNTER (OUTPATIENT)
Dept: OCCUPATIONAL THERAPY | Age: 48
Discharge: HOME | End: 2018-06-21
Admitting: FAMILY MEDICINE

## 2018-06-20 NOTE — PROGRESS NOTES
difficult to gather accurate measurements. Elbow flex/ext appeared functional, shoulder flexion appeared ~70*  R Shoulder ABduction 0-180: 66*  Right Hand AROM (degrees)  Right Hand General AROM: Fourth and fifth digits in flexed position but pt is able to extend fully, unable to fully extend from MCP joints. Wrist flexion/extension WFL    LUE Strength  Gross LUE Strength: Exceptions to [facility] (grossly 2/5 throughout)    RUE Strength  Gross RUE Strength: WFL                             Therapeutic Activities:  Fxl mobility in hallway ~200' with multiple rest breaks and breaks to reposition self in chair as he was sliding forward. Able to complete one turn to L. Playing Connect 4: Pt used L hand to  piece and then transferred to R hand. He then placed piece into board with R hand but used L hand and assist from therapist to stabilize R hand due to ataxia. Velcro card game: Pt used R hand to pull 10 cards off velcro board. He did use L arm to stabilize R arm at elbow. He was then able to put 7 cards back on the board, again stabilizing R UE with L UE as he reached to put cards on. Ataxia noted with activity. Caregiver asking about installing pool lift at family pool at home -- will inquire about this. Therapeutic Exercise:   Exercise/Equipment  Resistance/Repetitions   Other comments                    Home Exercise Program:    · Instructed caregiver to complete weight shift activities at home to prepare for toileting tasks (clothing management). · 6/6: Pt provided with 4 written exercises this date to perform with caregiver. Instructed caregiver to have pt attempt as much wheelchair fxl mobility as possible in his home. Goals  Short term goals  Time Frame for Short term goals:  In 10-12 weeks:  Short term goal 1: Pt will be independent with urinal use from seated level - ONGOING  Short term goal 2: Pt will complete fxl mobility from w/c level at supv to be able to get self to/from bathroom - GOAL MET 6/13/18  Short term goal 3: Pt will participate in UE HEP with caregiver assistance - ONGOING  Short term goal 4: Pt will participate in adaptive equipment trials and recommendations for ADL tasks - ONGOING  Patient Goals   Patient goals : \"to be able to self-propel w/c household distances and to use urinal independently\"    Timed Code Treatment Minutes:  45    Total Treatment Minutes:  45    Treatment/Activity Tolerance:     [x]  Patient tolerated treatment well []  Patient limited by fatigue    []  Patient limited by pain []  Patient limited by other medical complications   []  Other:     Prognosis: []  Good [x]  Fair  []  Poor    Patient Requires Follow-up:  [x]  Yes  []  No    Plan: [x]  Continue per plan of care []  Alter current plan (see comments)   []  Plan of care initiated []  Hold pending MD visit []  Discharge    Electronically signed by:  Alejandra Clark [id number]

## 2018-07-01 ENCOUNTER — HOSPITAL ENCOUNTER (OUTPATIENT)
Dept: OTHER | Age: 48
Discharge: HOME | End: 2018-07-31
Attending: FAMILY MEDICINE | Admitting: FAMILY MEDICINE

## 2018-07-11 ENCOUNTER — HOSPITAL ENCOUNTER (OUTPATIENT)
Dept: OCCUPATIONAL THERAPY | Age: 48
Discharge: HOME | End: 2018-07-12
Admitting: FAMILY MEDICINE

## 2018-07-11 NOTE — PROGRESS NOTES
Occupational Therapy      Occupational Therapy Daily Treatment Note    Date:  2018    Patient Name:  Flavio Engel     :  1970  MRN: [id number]  Restrictions/Precautions:  Fall risk; ataxia R UE/LE  Medical/Treatment Diagnosis Information:  ·  Z87.820  ·   Decreased functional mobility ; Decreased ROM; Decreased ADL status; Decreased strength;Decreased coordination;Decreased high-level IADLs;Decreased fine motor control;Decreased balance  Insurance/Certification information:   [facility]  Physician Information:   Dr Machelle Jackson of care signed (Y/N):  Y  Visit# / total visits: 7 /10   Pain level: 0/10     G-Code (if applicable):OT G-codes  Functional Assessment Tool Used: quick dash  Score: 43  Functional Limitation: Self care  Self Care Current Status (): At least 60 percent but less than 80 percent impaired, limited or restricted  Self Care Goal Status (): At least 40 percent but less than 60 percent impaired, limited or restricted  OutComes Score  QuickDASH Total Score: 43       QuickDASH Disability/Symptom Score : 72.73 %     Date / Visit # G-Code Applied: 18 / #1       Progress Note: [x]  Yes  []  No  Next due by: Visit #10      Subjective: Pt met in OP therapy gym for OT. Pt agreeable to therapy. Caregiver present. Objective Measures:  : Coordination  Movements Are Fluid And Coordinated: No  Coordination and Movement description: Fine motor impairments;Gross motor impairments; Ataxia; Decreased speed;Decreased accuracy; Right UE    Cognition  Overall Cognitive Status: Exceptions  Cognition Comment: Slurred speech, difficult to understand at times. Perseverates on phrases/ideas at times. Caregiver reports they hope to begin speech therapy soon.     Sensation  Overall Sensation Status: WFL    LUE AROM (degrees)  LUE AROM : WFL    RUE AROM (degrees)  RUE AROM : Exceptions  RUE General AROM: Pt has high tone throughout entire R UE and ataxia when completing ROM, home.      Goals  Short term goals  Time Frame for Short term goals:  In 10-12 weeks:  Short term goal 1: Pt will be independent with urinal use from seated level - ONGOING  Short term goal 2: Pt will complete fxl mobility from w/c level at supv to be able to get self to/from bathroom - GOAL MET 6/13/18  Short term goal 3: Pt will participate in UE HEP with caregiver assistance - ONGOING  Short term goal 4: Pt will participate in adaptive equipment trials and recommendations for ADL tasks - ONGOING  Patient Goals   Patient goals : \"to be able to self-propel w/c household distances and to use urinal independently\"    Timed Code Treatment Minutes:  53    Total Treatment Minutes:  53    Treatment/Activity Tolerance:     [x]  Patient tolerated treatment well []  Patient limited by fatigue    []  Patient limited by pain []  Patient limited by other medical complications   []  Other:     Prognosis: []  Good [x]  Fair  []  Poor    Patient Requires Follow-up:  [x]  Yes  []  No    Plan: [x]  Continue per plan of care []  Alter current plan (see comments)   []  Plan of care initiated []  Hold pending MD visit []  Discharge    Electronically signed by:  Andria Randle [id number]

## 2018-07-18 ENCOUNTER — HOSPITAL ENCOUNTER (OUTPATIENT)
Dept: OCCUPATIONAL THERAPY | Age: 48
Discharge: HOME | End: 2018-07-19
Admitting: FAMILY MEDICINE

## 2018-07-18 NOTE — PROGRESS NOTES
tasks (clothing management). · 6/6: Pt provided with 4 written exercises this date to perform with caregiver. Instructed caregiver to have pt attempt as much wheelchair fxl mobility as possible in his home. Goals  Short term goals  Time Frame for Short term goals:  In 10-12 weeks:  Short term goal 1: Pt will be independent with urinal use from seated level - ONGOING  Short term goal 2: Pt will complete fxl mobility from w/c level at supv to be able to get self to/from bathroom - GOAL MET 6/13/18  Short term goal 3: Pt will participate in UE HEP with caregiver assistance - ONGOING  Short term goal 4: Pt will participate in adaptive equipment trials and recommendations for ADL tasks - ONGOING  Patient Goals   Patient goals : \"to be able to self-propel w/c household distances and to use urinal independently\"    Timed Code Treatment Minutes:  45    Total Treatment Minutes:  45    Treatment/Activity Tolerance:     [x]  Patient tolerated treatment well []  Patient limited by fatigue    []  Patient limited by pain []  Patient limited by other medical complications   []  Other:     Prognosis: []  Good [x]  Fair  []  Poor    Patient Requires Follow-up:  [x]  Yes  []  No    Plan: [x]  Continue per plan of care []  Alter current plan (see comments)   []  Plan of care initiated []  Hold pending MD visit []  Discharge    Electronically signed by:  Yuval Robles [id number]

## 2018-07-25 ENCOUNTER — HOSPITAL ENCOUNTER (OUTPATIENT)
Dept: OCCUPATIONAL THERAPY | Age: 48
Discharge: HOME | End: 2018-07-26
Admitting: FAMILY MEDICINE

## 2018-07-25 NOTE — PROGRESS NOTES
Occupational Therapy   Occupational Therapy Txt Note/ Discharge Summary  Date: 2018   Patient Name: Neema Howell  MRN: [id number]     : 1970    Date of Service: 2018    Patient Diagnosis(es): There were no encounter diagnoses. has a past medical history of Allergic rhinitis; Anxiety; and History of head injury. has no past surgical history on file. Assessment: Pt tolerated nine weeks of OP OT well. He has shown improvements in tone of R UE and functional use. He has also shown improvements in ability to complete fxl mobility from w/c level. Pt continues to be limited by decreased balance and ataxia/tone in R UE/LE, however has reached his current potential for OT at this time. Pt met 3/4 STGs - did not meet goal of toileting independently however has shown improvements in sit < > stand transfers to assist in toileting tasks. Will d/c from OP OT. Pt may benefit from aquatic therapy to further address tone and ataxia in both R UE and LE. Will require script from MD if pt/family wishes to pursue aquatic therapy option. Restrictions  Restrictions/Precautions  Restrictions/Precautions: Fall Risk  Position Activity Restriction  Other position/activity restrictions: Ataxia R UE/LE    Subjective   General  Chart Reviewed: Yes  Patient assessed for rehabilitation services?: Yes  Additional Pertinent Hx: Pt is a 53 y.o. male referred to OP OT after sustaining a TBI in . This resulted in R sided deficits. PMH includes: Anxiety. Family / Caregiver Present: Yes (caregiver, Carrie Mahan)  Referring Practitioner: Simone Biggs  Diagnosis: P43.534  Subjective  Subjective: Pt met in OP therapy gym for OT evaluation. Caregiver, Carrie Mahan, present. Pt pleasant and agreeable to therapy. Denies pain.   Pain Assessment  Patient Currently in Pain: Denies   Visit:    Insurance Information: [employer]     Objective   RUE Modified [place] Scale  RUE Modified [place] Scale Completed: Yes  TAMIKO Modified [place] Scale  RUE Pectoralis: 1+  RUE Shoulder Adductors Score: 1+  RUE Bicep Score: 1+  RUE Tricep Score: 1+  Tone LUE  LUE Tone: Normotonic  Coordination  Movements Are Fluid And Coordinated: No  Coordination and Movement description: Fine motor impairments;Gross motor impairments; Ataxia; Decreased speed;Decreased accuracy; Right UE     Transfers  Sit to stand: Minimal assistance  Stand to sit: Minimal assistance  Transfer Comments: Pt completed 6 sit < > stand transfers in total with Min A. He transferred to grab bar to simulate transfers in bathroom at home. For last two transfer, pt practiced reaching for cone x 1 trial with each hand to simulate reaching for urinal.     Cognition  Overall Cognitive Status: Exceptions  Cognition Comment: Slurred speech, difficult to understand at times. Perseverates on phrases/ideas at times. LUE AROM (degrees)  LUE AROM : WFL  RUE AROM (degrees)  RUE AROM : Exceptions  RUE General AROM: Pt has high tone throughout entire R UE and ataxia when completing ROM, difficult to gather accurate measurements. Elbow flex/ext appeared functional, shoulder flexion appeared ~75*  R Shoulder ABduction 0-180: 75*  Right Hand AROM (degrees)  Right Hand General AROM: Fourth and fifth digits in flexed position but pt is able to extend fully, unable to fully extend from MCP joints. Wrist flexion/extension WFL  LUE Strength  Gross LUE Strength: WFL  RUE Strength  Gross RUE Strength: Exceptions to [facility] (grossly 2/5 throughout)      Therapeutic Activity: Pt completed 3 rounds of Connect Four using R UE to place pieces. He did require some assist to steady R UE. Ataxic movements noted throughout game. Assessment   Performance deficits / Impairments: Decreased functional mobility ; Decreased ROM; Decreased ADL status; Decreased strength;Decreased coordination;Decreased high-level IADLs;Decreased fine motor control;Decreased balance  Assessment: Pt tolerated nine weeks of OP OT well.  He has shown improvements in tone of R UE and functional use. He has also shown improvements in ability to complete fxl mobility from w/c level. Pt continues to be limited by decreased balance and ataxia/tone in R UE/LE, however has reached his current potential for OT at this time. Pt met 3/4 STGs - did not meet goal of toileting independently however has shown improvements in sit < > stand transfers to assist in toileting tasks. Will d/c from OP OT. Pt may benefit from aquatic therapy to further address tone and ataxia in both R UE and LE. Prognosis: Fair  Patient Education: Role of OT, POC, HEP  REQUIRES OT FOLLOW UP: No         Plan   Plan  Times per week: Pt now discharged from therapy after 1x/week for 9 weeks. G-Code  OT G-codes  Functional Assessment Tool Used: quick dash  Score: 42  Functional Limitation: Self care  Self Care Current Status (): At least 60 percent but less than 80 percent impaired, limited or restricted  Self Care Goal Status (): At least 40 percent but less than 60 percent impaired, limited or restricted  Self Care Discharge Status (): At least 60 percent but less than 80 percent impaired, limited or restricted  OutComes Score  QuickDASH Total Score: 42       QuickDASH Disability/Symptom Score : 70.45 %                 Goals  Short term goals  Time Frame for Short term goals:  In 10-12 weeks:  Short term goal 1: Pt will be independent with urinal use from seated level - NOT MET  Short term goal 2: Pt will complete fxl mobility from w/c level at supv to be able to get self to/from bathroom - GOAL MET  Short term goal 3: Pt will participate in UE HEP with caregiver assistance - GOAL MET  Short term goal 4: Pt will participate in adaptive equipment trials and recommendations for ADL tasks - GOAL MET  Patient Goals   Patient goals : \"to be able to self-propel w/c household distances and to use urinal independently\"       Therapy Time   Individual Concurrent Group Co-treatment Time In 0945         Time Out 1045         Minutes 60         Timed Code Treatment Minutes: [facility], OTR/L 3170

## 2018-07-27 ENCOUNTER — TELEPHONE (OUTPATIENT)
Dept: FAMILY MEDICINE CLINIC | Age: 48
End: 2018-07-27

## 2018-07-27 DIAGNOSIS — Z87.820 PERSONAL HISTORY OF TRAUMATIC BRAIN INJURY: Primary | ICD-10-CM

## 2018-08-01 ENCOUNTER — HOSPITAL ENCOUNTER (OUTPATIENT)
Dept: SPEECH THERAPY | Age: 48
Discharge: HOME | End: 2018-08-02
Admitting: FAMILY MEDICINE

## 2018-08-01 ENCOUNTER — HOSPITAL ENCOUNTER (OUTPATIENT)
Dept: OTHER | Age: 48
Discharge: HOME | End: 2018-08-31
Attending: FAMILY MEDICINE | Admitting: FAMILY MEDICINE

## 2018-08-01 DIAGNOSIS — R47.1 ATAXIC DYSARTHRIA: Primary | ICD-10-CM

## 2018-08-08 ENCOUNTER — HOSPITAL ENCOUNTER (OUTPATIENT)
Dept: SPEECH THERAPY | Age: 48
Discharge: HOME | End: 2018-08-09
Admitting: FAMILY MEDICINE

## 2018-08-08 NOTE — PROGRESS NOTES
ADDENDUM:  Speech Exercises  Do each exercise 10-15 times each day, 2-3 times per day  1. Hold ah as long and as loud as you can. Stop if you start to strain your voice. Dont forget to inhale through your nose first.  2. Count to 5 inhaling through your nose first and then speaking with an easy onset (releasing a little air before speaking) as you count. 3. Say whoop gliding from your lowest to highest pitch. 4. Say boom gliding from your highest to lowest pitch. 5. Do the following mouth movements taking your time to be as precise (quality over speed)   Say oo-ee pushing your lips together and pulling them back.  Say la pushing your tongue tip the roof of your mouth.  Say key pushing the back of your tongue up to the back of your mouth.

## 2018-08-09 RX ORDER — RISPERIDONE 0.5 MG/1
TABLET, FILM COATED ORAL
Qty: 180 TABLET | Refills: 0 | Status: SHIPPED | OUTPATIENT
Start: 2018-08-09 | End: 2018-12-11 | Stop reason: SDUPTHER

## 2018-08-15 ENCOUNTER — HOSPITAL ENCOUNTER (OUTPATIENT)
Dept: SPEECH THERAPY | Age: 48
Discharge: HOME | End: 2018-08-16
Admitting: FAMILY MEDICINE

## 2018-08-16 ENCOUNTER — TELEPHONE (OUTPATIENT)
Dept: FAMILY MEDICINE CLINIC | Age: 48
End: 2018-08-16

## 2018-08-16 DIAGNOSIS — G81.11 SPASTIC HEMIPLEGIA OF RIGHT DOMINANT SIDE DUE TO CEREBROVASCULAR DISEASE (HCC): Primary | ICD-10-CM

## 2018-08-16 DIAGNOSIS — K13.0 PERLECHE: ICD-10-CM

## 2018-08-16 DIAGNOSIS — I67.9 SPASTIC HEMIPLEGIA OF RIGHT DOMINANT SIDE DUE TO CEREBROVASCULAR DISEASE (HCC): Primary | ICD-10-CM

## 2018-08-22 ENCOUNTER — HOSPITAL ENCOUNTER (OUTPATIENT)
Dept: SPEECH THERAPY | Age: 48
Discharge: HOME | End: 2018-08-23
Admitting: FAMILY MEDICINE

## 2018-08-22 NOTE — PROGRESS NOTES
Speech-Language Pathology  Daily Treatment Note    Date:  2018    Patient Name:  Shaila Bergman    :  1970  MRN: [id number]  Restrictions/Precautions:  NA  Diagnosis:  Dysarthria  Treatment Diagnosis:  Ataxic dysarthria  Insurance/Certification information:  Isaiah  Referring Physician:  Juan Pablo Carvajal of care signed (Y/N):  Y  Visit# / total visits:  4/4  Pain level: 0/10     G-Code (if applicable):      Date / Visit # G-Code Applied:  /       Progress Note: []  Yes  [x]  No  Next due by: Visit #10     Subjective:    Chart Reviewed: Yes  Family / Caregiver Present: Yes  Subjective: Accepted and tolerated treatment in treatment office  Lives With: Family  Receives Help From: Family;Friend(s)  Education: graduated college  Leisure & Hobbies: playing cards, swimming, eating freshbag  Vision:  (h/o prior eye surgery)  Hearing: Within functional limits    Objective:   Goal 1: the patient will demonstrate understanding and use of compensatory speech intelligibility strategies given min cues  · mod-max cues  Goal 2: The patient will improve articulatory precision and coordination via verbal agility tasks to >50%  · 60% accuracy for oral agility  Goal 3: The patient will participate in conversation with >50% speech intelligibility  · 25% at sentence level this date   Goal 4: Additional goals to be determined as appropriate. · not addressed     Assessment:   Severe dysarthria characterized by 25% speech intelligibility at sentence level. Plan:   Requires SLP Intervention: Yes  Duration/Frequency of Treatment: ST to tx 1 time per week for 6-8 weeks for dysarthria    Education:  Patient Education: Completed on recommendations/plan. Home program introduced. Handout (below) provided. Patient Education Response: Verbalizes understanding    Timed Code Treatment: 0  Total Treatment Time: 45    Signature:    Teresa Escoto, #[id number]  Speech-Language Pathologist  18 11:15 AM ADDENDUM:  Speech Exercises  Do each exercise 10-15 times each day, 2-3 times per day  1. Hold ah as long and as loud as you can. Stop if you start to strain your voice. Dont forget to inhale through your nose first.  2. Count to 8 inhaling through your nose first and then speaking with an easy onset (releasing a little air before speaking) as you count. 3. Say whoop gliding from your lowest to highest pitch. 4. Say boom gliding from your highest to lowest pitch. 5. Do the following mouth movements taking your time to be as precise (quality over speed)   Say oo-ee pushing your lips together and pulling them back.  Say la pushing your tongue tip the roof of your mouth.  Say key pushing the back of your tongue up to the back of your mouth.

## 2018-08-29 ENCOUNTER — HOSPITAL ENCOUNTER (OUTPATIENT)
Dept: SPEECH THERAPY | Age: 48
Discharge: HOME | End: 2018-08-30
Admitting: FAMILY MEDICINE

## 2018-09-01 ENCOUNTER — HOSPITAL ENCOUNTER (OUTPATIENT)
Dept: OTHER | Age: 48
Discharge: HOME | End: 2018-09-01
Attending: FAMILY MEDICINE | Admitting: FAMILY MEDICINE

## 2018-10-08 RX ORDER — TAMSULOSIN HYDROCHLORIDE 0.4 MG/1
CAPSULE ORAL
Qty: 30 CAPSULE | Refills: 4 | Status: SHIPPED | OUTPATIENT
Start: 2018-10-08 | End: 2019-04-19 | Stop reason: SDUPTHER

## 2018-10-22 DIAGNOSIS — I67.9 SPASTIC HEMIPLEGIA OF RIGHT DOMINANT SIDE DUE TO CEREBROVASCULAR DISEASE (HCC): Primary | ICD-10-CM

## 2018-10-22 DIAGNOSIS — Z87.820 PERSONAL HISTORY OF TRAUMATIC BRAIN INJURY: ICD-10-CM

## 2018-10-22 DIAGNOSIS — G81.11 SPASTIC HEMIPLEGIA OF RIGHT DOMINANT SIDE DUE TO CEREBROVASCULAR DISEASE (HCC): Primary | ICD-10-CM

## 2018-11-01 DIAGNOSIS — Z87.820 PERSONAL HISTORY OF TRAUMATIC BRAIN INJURY: Primary | ICD-10-CM

## 2018-11-30 ENCOUNTER — OFFICE VISIT (OUTPATIENT)
Dept: FAMILY MEDICINE CLINIC | Age: 48
End: 2018-11-30
Payer: MEDICARE

## 2018-11-30 VITALS — TEMPERATURE: 97.5 F | DIASTOLIC BLOOD PRESSURE: 64 MMHG | SYSTOLIC BLOOD PRESSURE: 118 MMHG

## 2018-11-30 DIAGNOSIS — I67.9 SPASTIC HEMIPLEGIA OF RIGHT DOMINANT SIDE DUE TO CEREBROVASCULAR DISEASE (HCC): Primary | ICD-10-CM

## 2018-11-30 DIAGNOSIS — Z23 NEEDS FLU SHOT: ICD-10-CM

## 2018-11-30 DIAGNOSIS — G81.11 SPASTIC HEMIPLEGIA OF RIGHT DOMINANT SIDE DUE TO CEREBROVASCULAR DISEASE (HCC): Primary | ICD-10-CM

## 2018-11-30 PROCEDURE — G0008 ADMIN INFLUENZA VIRUS VAC: HCPCS | Performed by: FAMILY MEDICINE

## 2018-11-30 PROCEDURE — 90686 IIV4 VACC NO PRSV 0.5 ML IM: CPT | Performed by: FAMILY MEDICINE

## 2018-11-30 PROCEDURE — 99213 OFFICE O/P EST LOW 20 MIN: CPT | Performed by: FAMILY MEDICINE

## 2018-11-30 RX ORDER — OXYBUTYNIN CHLORIDE 10 MG/1
10 TABLET, EXTENDED RELEASE ORAL DAILY
COMMUNITY
Start: 2018-11-23 | End: 2019-10-31 | Stop reason: ALTCHOICE

## 2018-11-30 ASSESSMENT — PATIENT HEALTH QUESTIONNAIRE - PHQ9: DEPRESSION UNABLE TO ASSESS: FUNCTIONAL CAPACITY MOTIVATION LIMITS ACCURACY

## 2018-11-30 NOTE — PROGRESS NOTES
Vaccine Information Sheet, \"Influenza - Inactivated\"  given to Nadine Clark, or parent/legal guardian of  Nadine Clark and verbalized understanding. Patient responses:    Have you ever had a reaction to a flu vaccine? No  Are you able to eat eggs without adverse effects? Yes  Do you have any current illness? No  Have you ever had Guillian [place] Syndrome? No    Flu vaccine given per order. Please see immunization tab.

## 2018-12-11 ENCOUNTER — OFFICE VISIT (OUTPATIENT)
Dept: FAMILY MEDICINE CLINIC | Age: 48
End: 2018-12-11
Payer: MEDICARE

## 2018-12-11 VITALS — DIASTOLIC BLOOD PRESSURE: 80 MMHG | TEMPERATURE: 97.6 F | SYSTOLIC BLOOD PRESSURE: 122 MMHG

## 2018-12-11 DIAGNOSIS — L50.9 HIVES: ICD-10-CM

## 2018-12-11 DIAGNOSIS — L50.9 HIVES: Primary | ICD-10-CM

## 2018-12-11 LAB
A/G RATIO: 1.4 (ref 1.1–2.2)
ALBUMIN SERPL-MCNC: 4.5 G/DL (ref 3.4–5)
ALP BLD-CCNC: 86 U/L (ref 40–129)
ALT SERPL-CCNC: 23 U/L (ref 10–40)
ANION GAP SERPL CALCULATED.3IONS-SCNC: 15 MMOL/L (ref 3–16)
AST SERPL-CCNC: 19 U/L (ref 15–37)
BASOPHILS ABSOLUTE: 0 K/UL (ref 0–0.2)
BASOPHILS RELATIVE PERCENT: 0.5 %
BILIRUB SERPL-MCNC: 0.6 MG/DL (ref 0–1)
BUN BLDV-MCNC: 11 MG/DL (ref 7–20)
CALCIUM SERPL-MCNC: 9.9 MG/DL (ref 8.3–10.6)
CHLORIDE BLD-SCNC: 102 MMOL/L (ref 99–110)
CO2: 24 MMOL/L (ref 21–32)
CREAT SERPL-MCNC: 0.7 MG/DL (ref 0.9–1.3)
EOSINOPHILS ABSOLUTE: 0.1 K/UL (ref 0–0.6)
EOSINOPHILS RELATIVE PERCENT: 1 %
GFR AFRICAN AMERICAN: >60
GFR NON-AFRICAN AMERICAN: >60
GLOBULIN: 3.2 G/DL
GLUCOSE BLD-MCNC: 107 MG/DL (ref 70–99)
HCT VFR BLD CALC: 47.9 % (ref 40.5–52.5)
HEMOGLOBIN: 16.3 G/DL (ref 13.5–17.5)
LYMPHOCYTES ABSOLUTE: 1.5 K/UL (ref 1–5.1)
LYMPHOCYTES RELATIVE PERCENT: 22.6 %
MCH RBC QN AUTO: 29.9 PG (ref 26–34)
MCHC RBC AUTO-ENTMCNC: 34 G/DL (ref 31–36)
MCV RBC AUTO: 87.8 FL (ref 80–100)
MONOCYTES ABSOLUTE: 0.6 K/UL (ref 0–1.3)
MONOCYTES RELATIVE PERCENT: 8.7 %
NEUTROPHILS ABSOLUTE: 4.3 K/UL (ref 1.7–7.7)
NEUTROPHILS RELATIVE PERCENT: 67.2 %
PDW BLD-RTO: 13.8 % (ref 12.4–15.4)
PLATELET # BLD: 191 K/UL (ref 135–450)
PMV BLD AUTO: 9.3 FL (ref 5–10.5)
POTASSIUM SERPL-SCNC: 4.4 MMOL/L (ref 3.5–5.1)
RBC # BLD: 5.45 M/UL (ref 4.2–5.9)
SODIUM BLD-SCNC: 141 MMOL/L (ref 136–145)
TOTAL PROTEIN: 7.7 G/DL (ref 6.4–8.2)
WBC # BLD: 6.4 K/UL (ref 4–11)

## 2018-12-11 PROCEDURE — 99213 OFFICE O/P EST LOW 20 MIN: CPT | Performed by: FAMILY MEDICINE

## 2018-12-11 RX ORDER — PREDNISONE 10 MG/1
TABLET ORAL
Qty: 16 TABLET | Refills: 0 | Status: SHIPPED | OUTPATIENT
Start: 2018-12-11 | End: 2019-10-31 | Stop reason: ALTCHOICE

## 2018-12-11 RX ORDER — RISPERIDONE 0.5 MG/1
0.5 TABLET, FILM COATED ORAL EVERY EVENING
Qty: 180 TABLET | Refills: 0
Start: 2018-12-11 | End: 2019-03-13 | Stop reason: SDUPTHER

## 2018-12-11 ASSESSMENT — PATIENT HEALTH QUESTIONNAIRE - PHQ9: DEPRESSION UNABLE TO ASSESS: FUNCTIONAL CAPACITY MOTIVATION LIMITS ACCURACY

## 2018-12-28 ENCOUNTER — TELEPHONE (OUTPATIENT)
Dept: FAMILY MEDICINE CLINIC | Age: 48
End: 2018-12-28
Payer: MEDICARE

## 2018-12-28 DIAGNOSIS — G81.11 SPASTIC HEMIPLEGIA OF RIGHT DOMINANT SIDE DUE TO CEREBROVASCULAR DISEASE (HCC): Primary | ICD-10-CM

## 2018-12-28 DIAGNOSIS — I67.9 SPASTIC HEMIPLEGIA OF RIGHT DOMINANT SIDE DUE TO CEREBROVASCULAR DISEASE (HCC): Primary | ICD-10-CM

## 2018-12-28 PROCEDURE — G0180 MD CERTIFICATION HHA PATIENT: HCPCS | Performed by: FAMILY MEDICINE

## 2019-01-04 PROCEDURE — G0180 MD CERTIFICATION HHA PATIENT: HCPCS | Performed by: FAMILY MEDICINE

## 2019-01-10 ENCOUNTER — TELEPHONE (OUTPATIENT)
Dept: FAMILY MEDICINE CLINIC | Age: 49
End: 2019-01-10
Payer: MEDICARE

## 2019-01-10 DIAGNOSIS — G81.11 SPASTIC HEMIPLEGIA OF RIGHT DOMINANT SIDE DUE TO CEREBROVASCULAR DISEASE (HCC): Primary | ICD-10-CM

## 2019-01-10 DIAGNOSIS — I67.9 SPASTIC HEMIPLEGIA OF RIGHT DOMINANT SIDE DUE TO CEREBROVASCULAR DISEASE (HCC): Primary | ICD-10-CM

## 2019-01-24 DIAGNOSIS — I67.9 SPASTIC HEMIPLEGIA OF RIGHT DOMINANT SIDE DUE TO CEREBROVASCULAR DISEASE (HCC): ICD-10-CM

## 2019-01-24 DIAGNOSIS — Z87.820 PERSONAL HISTORY OF TRAUMATIC BRAIN INJURY: Primary | ICD-10-CM

## 2019-01-24 DIAGNOSIS — G81.11 SPASTIC HEMIPLEGIA OF RIGHT DOMINANT SIDE DUE TO CEREBROVASCULAR DISEASE (HCC): ICD-10-CM

## 2019-02-08 LAB
BILIRUBIN URINE: NEGATIVE
BLOOD, URINE: NEGATIVE
CLARITY: ABNORMAL
COLOR: ABNORMAL
GLUCOSE URINE: NEGATIVE MG/DL
KETONES, URINE: NEGATIVE MG/DL
LEUKOCYTE ESTERASE, URINE: NEGATIVE
MICROSCOPIC EXAMINATION: YES
NITRITE, URINE: NEGATIVE
PH UA: 6
PROTEIN UA: ABNORMAL MG/DL
SPECIFIC GRAVITY UA: >1.03
URINE TYPE: ABNORMAL
UROBILINOGEN, URINE: 0.2 E.U./DL

## 2019-02-09 LAB
COMMENT UA: NORMAL
EPITHELIAL CELLS, UA: 0 /HPF (ref 0–5)
HYALINE CASTS: 0 /LPF (ref 0–8)
RBC UA: NORMAL /HPF (ref 0–2)
WBC UA: 2 /HPF (ref 0–5)

## 2019-02-10 LAB — URINE CULTURE, ROUTINE: NORMAL

## 2019-02-19 ENCOUNTER — TELEPHONE (OUTPATIENT)
Dept: FAMILY MEDICINE CLINIC | Age: 49
End: 2019-02-19
Payer: MEDICARE

## 2019-02-19 DIAGNOSIS — G81.11 SPASTIC HEMIPLEGIA OF RIGHT DOMINANT SIDE DUE TO CEREBROVASCULAR DISEASE (HCC): Primary | ICD-10-CM

## 2019-02-19 DIAGNOSIS — I67.9 SPASTIC HEMIPLEGIA OF RIGHT DOMINANT SIDE DUE TO CEREBROVASCULAR DISEASE (HCC): Primary | ICD-10-CM

## 2019-02-19 PROCEDURE — G0180 MD CERTIFICATION HHA PATIENT: HCPCS | Performed by: FAMILY MEDICINE

## 2019-03-12 ENCOUNTER — TELEPHONE (OUTPATIENT)
Dept: FAMILY MEDICINE CLINIC | Age: 49
End: 2019-03-12

## 2019-03-15 RX ORDER — RISPERIDONE 0.5 MG/1
0.5 TABLET, FILM COATED ORAL EVERY EVENING
Qty: 90 TABLET | Refills: 1 | Status: SHIPPED | OUTPATIENT
Start: 2019-03-15 | End: 2019-08-26 | Stop reason: SDUPTHER

## 2019-03-26 ENCOUNTER — TELEPHONE (OUTPATIENT)
Dept: FAMILY MEDICINE CLINIC | Age: 49
End: 2019-03-26

## 2019-03-26 DIAGNOSIS — G81.11 SPASTIC HEMIPLEGIA OF RIGHT DOMINANT SIDE DUE TO CEREBROVASCULAR DISEASE (HCC): ICD-10-CM

## 2019-03-26 DIAGNOSIS — I67.9 SPASTIC HEMIPLEGIA OF RIGHT DOMINANT SIDE DUE TO CEREBROVASCULAR DISEASE (HCC): ICD-10-CM

## 2019-03-26 DIAGNOSIS — Z87.820 PERSONAL HISTORY OF TRAUMATIC BRAIN INJURY: Primary | ICD-10-CM

## 2019-04-02 DIAGNOSIS — Z87.820 PERSONAL HISTORY OF TRAUMATIC BRAIN INJURY: Primary | ICD-10-CM

## 2019-04-02 DIAGNOSIS — I67.9 SPASTIC HEMIPLEGIA OF RIGHT DOMINANT SIDE DUE TO CEREBROVASCULAR DISEASE (HCC): ICD-10-CM

## 2019-04-02 DIAGNOSIS — G81.11 SPASTIC HEMIPLEGIA OF RIGHT DOMINANT SIDE DUE TO CEREBROVASCULAR DISEASE (HCC): ICD-10-CM

## 2019-04-02 DIAGNOSIS — G81.90 HEMIPLEGIA, UNSPECIFIED ETIOLOGY, UNSPECIFIED HEMIPLEGIA TYPE, UNSPECIFIED LATERALITY (HCC): ICD-10-CM

## 2019-04-02 RX ORDER — WHEELCHAIR
EACH MISCELLANEOUS
Qty: 1 EACH | Refills: 0 | Status: SHIPPED | OUTPATIENT
Start: 2019-04-02

## 2019-04-03 ENCOUNTER — HOSPITAL ENCOUNTER (OUTPATIENT)
Dept: GENERAL RADIOLOGY | Age: 49
Discharge: HOME | End: 2019-04-03

## 2019-04-03 ENCOUNTER — HOSPITAL ENCOUNTER (OUTPATIENT)
Dept: SPEECH THERAPY | Age: 49
Setting detail: THERAPIES SERIES
Discharge: HOME | End: 2019-04-03

## 2019-04-03 DIAGNOSIS — Z87.820 PERSONAL HISTORY OF TRAUMATIC BRAIN INJURY: ICD-10-CM

## 2019-04-03 DIAGNOSIS — I67.9 SPASTIC HEMIPLEGIA OF RIGHT DOMINANT SIDE DUE TO CEREBROVASCULAR DISEASE (HCC): ICD-10-CM

## 2019-04-03 DIAGNOSIS — G81.11 SPASTIC HEMIPLEGIA OF RIGHT DOMINANT SIDE DUE TO CEREBROVASCULAR DISEASE (HCC): ICD-10-CM

## 2019-04-03 PROCEDURE — 92611 MOTION FLUOROSCOPY/SWALLOW: CPT

## 2019-04-03 NOTE — PROCEDURES
Symptoms:  · Premature loss of thin and nectar thick liquids  · Premature loss while masticating food to the valleculae and inconsistently to the pyriform sinus  · Lingual mashing A-P oral transit of textured items  · Pooling of all items to the valleculae prior to swallow  · Pooling of thin liquids; nectar thick liquids and inconsistently of foods to the pyriform sinus prior to the swallow  · Inconsistent penetration of thin liquids without clearance, but delayed cough response to residual penetration  · Post swallow oral residue pools to the pharynx post swallow at times to the pyriform sinus with spontaneous clearance swallow  · Pt takes large gulps when drinking liquids requiring 4-6 swallows to clear one large gulp    3. Analysis of compensatory strategies  · Pt had problems self regulating rate of intake   · Pt had problems with bolus formation strategy of foods  · Pt 's cough appeared to clear penetration of liquids  · Pt was unable to complete compensatory posture strategies    4. Pt's cognition and concern for oral sensory deficits exacerbate risks (       Dysphagia Score: Dysphagia Outcome Severity Scale: Level 5: Mild dysphagia- Distant supervision. May need one diet consistency restricted    Aspiration/Penetration Risk:   an episode of penetration of thin liquids 2/2 to reduced bolus control and delayed initiation of swallow. Did not completely clear with swallow. Pt did have a cough response which appeared to clear    Diet Recommendations:  Solid consistency: Dysphagia III Advanced   Liquid consistency: Thin(close monitor and training in small sips.  If unable;may need to thicken to nectar thick)   Medication administration: Meds in puree     Compensatory Swallow Strategies:   Upright as possible for all oral intake, Small bites/sips, External pacing, Swallow 2 times per bite/sip, Remain upright for 30-45 minutes after meals(oral care post meals and post med pass)    Plan:     Therapy:  Requires SLP Intervention: Yes    Referrals:  SLP for Oropharyngeal Dysphagia treatment trial if not already attempted      Therapy Interventions:    Patient/Family education, Bolus control exercises, Oral care, Oral motor exercises, DPNS, Vital Stim/NMES ; oral sensory stimulation    Prognosis:  Prognosis for safe diet advancement: good  Barriers to reach goals: cognitive deficits(guarded)  Consulted and agree with results and recommendations: Patient, Other (add comment)(caregiver who accompanied pt; phone call to pt's mother)    Education:  Consulted and agree with results and recommendations: Patient, Other (add comment)(caregiver who accompanied pt; phone call to pt's mother)  Patient Education: pt/caregiver ed  Patient Education Response: Verbalizes understanding, No evidence of learning, Needs reinforcement    SLP Individual Minutes  Time In: 1050  Time Out: 1125  Minutes: 54  Coded treatment time 0         Pamela Barlow MS,CCC,SLP [id number]  Speech and Language Pathologist   4/3/2019 at 11:58 AM

## 2019-04-16 ENCOUNTER — TELEPHONE (OUTPATIENT)
Dept: FAMILY MEDICINE CLINIC | Age: 49
End: 2019-04-16

## 2019-04-16 DIAGNOSIS — G81.11 SPASTIC HEMIPLEGIA OF RIGHT DOMINANT SIDE DUE TO CEREBROVASCULAR DISEASE (HCC): Primary | ICD-10-CM

## 2019-04-16 DIAGNOSIS — I67.9 SPASTIC HEMIPLEGIA OF RIGHT DOMINANT SIDE DUE TO CEREBROVASCULAR DISEASE (HCC): Primary | ICD-10-CM

## 2019-04-16 PROCEDURE — G0179 MD RECERTIFICATION HHA PT: HCPCS | Performed by: FAMILY MEDICINE

## 2019-04-19 RX ORDER — TAMSULOSIN HYDROCHLORIDE 0.4 MG/1
CAPSULE ORAL
Qty: 30 CAPSULE | Refills: 5 | Status: SHIPPED | OUTPATIENT
Start: 2019-04-19 | End: 2019-11-05 | Stop reason: SDUPTHER

## 2019-06-11 ENCOUNTER — TELEPHONE (OUTPATIENT)
Dept: FAMILY MEDICINE CLINIC | Age: 49
End: 2019-06-11

## 2019-06-11 NOTE — TELEPHONE ENCOUNTER
[facility] got a referral from Dr. Emerson Moritz and BOD[facility] has some questions, would like to speak with Dr. Emerson Moritz about the referral.    Pl advise.    [phone]

## 2019-06-13 ENCOUNTER — TELEPHONE (OUTPATIENT)
Dept: FAMILY MEDICINE CLINIC | Age: 49
End: 2019-06-13

## 2019-06-13 DIAGNOSIS — G81.11 SPASTIC HEMIPLEGIA OF RIGHT DOMINANT SIDE DUE TO CEREBROVASCULAR DISEASE (HCC): Primary | ICD-10-CM

## 2019-06-13 DIAGNOSIS — I67.9 SPASTIC HEMIPLEGIA OF RIGHT DOMINANT SIDE DUE TO CEREBROVASCULAR DISEASE (HCC): Primary | ICD-10-CM

## 2019-06-13 PROCEDURE — G0179 MD RECERTIFICATION HHA PT: HCPCS | Performed by: FAMILY MEDICINE

## 2019-06-13 NOTE — TELEPHONE ENCOUNTER
Wendie Maguire OT with [facility] calling to let you know she did an evaluation only on pt. And PT will address the other issues with pt and family.         Any questions,  [phone]

## 2019-06-18 ENCOUNTER — TELEPHONE (OUTPATIENT)
Dept: FAMILY MEDICINE CLINIC | Age: 49
End: 2019-06-18

## 2019-08-13 ENCOUNTER — TELEPHONE (OUTPATIENT)
Dept: FAMILY MEDICINE CLINIC | Age: 49
End: 2019-08-13

## 2019-08-13 DIAGNOSIS — G81.11 SPASTIC HEMIPLEGIA OF RIGHT DOMINANT SIDE DUE TO CEREBROVASCULAR DISEASE (HCC): Primary | ICD-10-CM

## 2019-08-13 DIAGNOSIS — I67.9 SPASTIC HEMIPLEGIA OF RIGHT DOMINANT SIDE DUE TO CEREBROVASCULAR DISEASE (HCC): Primary | ICD-10-CM

## 2019-08-13 PROCEDURE — G0179 MD RECERTIFICATION HHA PT: HCPCS | Performed by: FAMILY MEDICINE

## 2019-08-26 RX ORDER — RISPERIDONE 0.5 MG/1
TABLET, FILM COATED ORAL
Qty: 90 TABLET | Refills: 1 | Status: SHIPPED | OUTPATIENT
Start: 2019-08-26 | End: 2020-03-01

## 2019-10-31 ENCOUNTER — OFFICE VISIT (OUTPATIENT)
Dept: FAMILY MEDICINE CLINIC | Age: 49
End: 2019-10-31
Payer: MEDICARE

## 2019-10-31 VITALS
HEART RATE: 80 BPM | SYSTOLIC BLOOD PRESSURE: 128 MMHG | HEIGHT: 69 IN | BODY MASS INDEX: 28.06 KG/M2 | DIASTOLIC BLOOD PRESSURE: 84 MMHG | TEMPERATURE: 98.2 F

## 2019-10-31 DIAGNOSIS — F41.9 ANXIETY: ICD-10-CM

## 2019-10-31 DIAGNOSIS — Z23 NEEDS FLU SHOT: Primary | ICD-10-CM

## 2019-10-31 DIAGNOSIS — I67.9 SPASTIC HEMIPLEGIA OF RIGHT DOMINANT SIDE DUE TO CEREBROVASCULAR DISEASE (HCC): ICD-10-CM

## 2019-10-31 DIAGNOSIS — G81.11 SPASTIC HEMIPLEGIA OF RIGHT DOMINANT SIDE DUE TO CEREBROVASCULAR DISEASE (HCC): ICD-10-CM

## 2019-10-31 DIAGNOSIS — Z87.820 PERSONAL HISTORY OF TRAUMATIC BRAIN INJURY: ICD-10-CM

## 2019-10-31 PROCEDURE — G8427 DOCREV CUR MEDS BY ELIG CLIN: HCPCS | Performed by: FAMILY MEDICINE

## 2019-10-31 PROCEDURE — 99213 OFFICE O/P EST LOW 20 MIN: CPT | Performed by: FAMILY MEDICINE

## 2019-10-31 PROCEDURE — G8482 FLU IMMUNIZE ORDER/ADMIN: HCPCS | Performed by: FAMILY MEDICINE

## 2019-10-31 PROCEDURE — G8419 CALC BMI OUT NRM PARAM NOF/U: HCPCS | Performed by: FAMILY MEDICINE

## 2019-10-31 PROCEDURE — 1036F TOBACCO NON-USER: CPT | Performed by: FAMILY MEDICINE

## 2019-10-31 PROCEDURE — 90686 IIV4 VACC NO PRSV 0.5 ML IM: CPT | Performed by: FAMILY MEDICINE

## 2019-10-31 PROCEDURE — G0008 ADMIN INFLUENZA VIRUS VAC: HCPCS | Performed by: FAMILY MEDICINE

## 2019-10-31 RX ORDER — DANTROLENE SODIUM 25 MG/1
25 CAPSULE ORAL
COMMUNITY
Start: 2019-09-03 | End: 2020-09-01

## 2019-10-31 ASSESSMENT — PATIENT HEALTH QUESTIONNAIRE - PHQ9: DEPRESSION UNABLE TO ASSESS: FUNCTIONAL CAPACITY MOTIVATION LIMITS ACCURACY

## 2019-11-05 RX ORDER — TAMSULOSIN HYDROCHLORIDE 0.4 MG/1
CAPSULE ORAL
Qty: 30 CAPSULE | Refills: 5 | Status: SHIPPED | OUTPATIENT
Start: 2019-11-05 | End: 2020-05-18

## 2019-11-21 ENCOUNTER — TELEPHONE (OUTPATIENT)
Dept: FAMILY MEDICINE CLINIC | Age: 49
End: 2019-11-21

## 2019-11-26 ENCOUNTER — TELEPHONE (OUTPATIENT)
Dept: FAMILY MEDICINE CLINIC | Age: 49
End: 2019-11-26

## 2020-03-17 RX ORDER — SERTRALINE HYDROCHLORIDE 25 MG/1
25 TABLET, FILM COATED ORAL DAILY
Qty: 30 TABLET | Refills: 3 | Status: SHIPPED | OUTPATIENT
Start: 2020-03-17 | End: 2020-07-14

## 2020-05-08 ENCOUNTER — TELEPHONE (OUTPATIENT)
Dept: FAMILY MEDICINE CLINIC | Age: 50
End: 2020-05-08

## 2020-05-08 NOTE — TELEPHONE ENCOUNTER
I saw him yesterday in his home about 5:30  I was masked   Mother had called me earlier in day  He had a rash over the last week on arms and leg  Itches  No one else with rash  No exposure  He is other wise well  Rash appeared to be small red bumps on the right lower arm and lower legs  Consistent with insect bite  He had been outside at the start going down road in his wheelchair   He will use benadryl at night  otc 2.5% hc cream  He has no bed bugs and he has a new mattress about 1weeks old    He is well and mother requested restart to help him continue to keep what he has and not to deteiorate

## 2020-05-13 ENCOUNTER — TELEPHONE (OUTPATIENT)
Dept: FAMILY MEDICINE CLINIC | Age: 50
End: 2020-05-13

## 2020-05-26 RX ORDER — RISPERIDONE 0.5 MG/1
TABLET, FILM COATED ORAL
Qty: 90 TABLET | Refills: 0 | Status: SHIPPED | OUTPATIENT
Start: 2020-05-26 | End: 2020-08-28

## 2020-06-09 ENCOUNTER — VIRTUAL VISIT (OUTPATIENT)
Dept: FAMILY MEDICINE CLINIC | Age: 50
End: 2020-06-09
Payer: MEDICARE

## 2020-06-09 PROCEDURE — G8427 DOCREV CUR MEDS BY ELIG CLIN: HCPCS | Performed by: FAMILY MEDICINE

## 2020-06-09 PROCEDURE — 99213 OFFICE O/P EST LOW 20 MIN: CPT | Performed by: FAMILY MEDICINE

## 2020-06-09 ASSESSMENT — PATIENT HEALTH QUESTIONNAIRE - PHQ9: DEPRESSION UNABLE TO ASSESS: FUNCTIONAL CAPACITY MOTIVATION LIMITS ACCURACY

## 2020-06-16 ENCOUNTER — TELEPHONE (OUTPATIENT)
Dept: FAMILY MEDICINE CLINIC | Age: 50
End: 2020-06-16

## 2020-07-14 ENCOUNTER — TELEPHONE (OUTPATIENT)
Dept: FAMILY MEDICINE CLINIC | Age: 50
End: 2020-07-14
Payer: MEDICARE

## 2020-07-14 PROCEDURE — G0180 MD CERTIFICATION HHA PATIENT: HCPCS | Performed by: FAMILY MEDICINE

## 2020-08-28 RX ORDER — RISPERIDONE 0.5 MG/1
TABLET, FILM COATED ORAL
Qty: 90 TABLET | Refills: 0 | Status: SHIPPED | OUTPATIENT
Start: 2020-08-28 | End: 2020-11-23

## 2020-09-01 RX ORDER — DANTROLENE SODIUM 25 MG/1
25 CAPSULE ORAL 2 TIMES DAILY
Qty: 180 CAPSULE | Refills: 1 | Status: SHIPPED | OUTPATIENT
Start: 2020-09-01

## 2020-09-01 NOTE — PROGRESS NOTES
Mother stopped by and needed refill of this med for son  This was given originally by (?) angi guillermo and [facility]     It was at 4 a day but she decreased as it was causing erica to be sleepy    Orders Placed This Encounter   Medications    dantrolene (DANTRIUM) 25 MG capsule     Sig: Take 1 capsule by mouth 2 times daily     Dispense:  180 capsule     Refill:  1

## 2020-09-22 ENCOUNTER — OFFICE VISIT (OUTPATIENT)
Dept: FAMILY MEDICINE CLINIC | Age: 50
End: 2020-09-22
Payer: MEDICARE

## 2020-09-22 VITALS
HEART RATE: 92 BPM | WEIGHT: 180 LBS | BODY MASS INDEX: 26.66 KG/M2 | HEIGHT: 69 IN | DIASTOLIC BLOOD PRESSURE: 78 MMHG | SYSTOLIC BLOOD PRESSURE: 110 MMHG | TEMPERATURE: 97.2 F

## 2020-09-22 DIAGNOSIS — E03.9 ACQUIRED HYPOTHYROIDISM: ICD-10-CM

## 2020-09-22 DIAGNOSIS — E78.2 HYPERLIPIDEMIA, MIXED: ICD-10-CM

## 2020-09-22 LAB
A/G RATIO: 1.6 (ref 1.1–2.2)
ALBUMIN SERPL-MCNC: 4.5 G/DL (ref 3.4–5)
ALP BLD-CCNC: 86 U/L (ref 40–129)
ALT SERPL-CCNC: 21 U/L (ref 10–40)
ANION GAP SERPL CALCULATED.3IONS-SCNC: 12 MMOL/L (ref 3–16)
AST SERPL-CCNC: 23 U/L (ref 15–37)
BILIRUB SERPL-MCNC: 0.4 MG/DL (ref 0–1)
BILIRUBIN, POC: NORMAL
BLOOD URINE, POC: NORMAL
BUN BLDV-MCNC: 14 MG/DL (ref 7–20)
CALCIUM SERPL-MCNC: 10 MG/DL (ref 8.3–10.6)
CHLORIDE BLD-SCNC: 99 MMOL/L (ref 99–110)
CHOLESTEROL, TOTAL: 166 MG/DL (ref 0–199)
CLARITY, POC: CLEAR
CO2: 25 MMOL/L (ref 21–32)
COLOR, POC: YELLOW
CREAT SERPL-MCNC: 0.9 MG/DL (ref 0.9–1.3)
GFR AFRICAN AMERICAN: >60
GFR NON-AFRICAN AMERICAN: >60
GLOBULIN: 2.8 G/DL
GLUCOSE BLD-MCNC: 93 MG/DL (ref 70–99)
GLUCOSE URINE, POC: NORMAL
KETONES, POC: NORMAL
LEUKOCYTE EST, POC: NORMAL
NITRITE, POC: NORMAL
PH, POC: 7.5
POTASSIUM SERPL-SCNC: 4.4 MMOL/L (ref 3.5–5.1)
PROTEIN, POC: NORMAL
SODIUM BLD-SCNC: 136 MMOL/L (ref 136–145)
SPECIFIC GRAVITY, POC: 1.02
TOTAL PROTEIN: 7.3 G/DL (ref 6.4–8.2)
TSH SERPL DL<=0.05 MIU/L-ACNC: 3.2 UIU/ML (ref 0.27–4.2)
UROBILINOGEN, POC: 0.2

## 2020-09-22 PROCEDURE — 99213 OFFICE O/P EST LOW 20 MIN: CPT | Performed by: FAMILY MEDICINE

## 2020-09-22 PROCEDURE — 81002 URINALYSIS NONAUTO W/O SCOPE: CPT | Performed by: FAMILY MEDICINE

## 2020-09-22 PROCEDURE — 1036F TOBACCO NON-USER: CPT | Performed by: FAMILY MEDICINE

## 2020-09-22 PROCEDURE — 90686 IIV4 VACC NO PRSV 0.5 ML IM: CPT | Performed by: FAMILY MEDICINE

## 2020-09-22 PROCEDURE — G8419 CALC BMI OUT NRM PARAM NOF/U: HCPCS | Performed by: FAMILY MEDICINE

## 2020-09-22 PROCEDURE — G8427 DOCREV CUR MEDS BY ELIG CLIN: HCPCS | Performed by: FAMILY MEDICINE

## 2020-09-22 PROCEDURE — G0008 ADMIN INFLUENZA VIRUS VAC: HCPCS | Performed by: FAMILY MEDICINE

## 2020-09-22 ASSESSMENT — PATIENT HEALTH QUESTIONNAIRE - PHQ9
SUM OF ALL RESPONSES TO PHQ9 QUESTIONS 1 & 2: 0
SUM OF ALL RESPONSES TO PHQ QUESTIONS 1-9: 0
2. FEELING DOWN, DEPRESSED OR HOPELESS: 0
SUM OF ALL RESPONSES TO PHQ QUESTIONS 1-9: 0
1. LITTLE INTEREST OR PLEASURE IN DOING THINGS: 0

## 2020-09-22 NOTE — PROGRESS NOTES
Subjective:      Patient ID: Maribel Gomes is a 53 y.o. male. Patient presents with: Annual Exam: cpe    Here with mother  Caretaker family uses also here  He is well overall and no c/o  He is not able to give adequate hx   But voices no c/o  Well    meds noted     YOB: 1970    Date of Visit:  9/22/2020     -- Oxybutynin -- Hives    Current Outpatient Medications:  dantrolene (DANTRIUM) 25 MG capsule, Take 1 capsule by mouth 2 times daily, Disp: 180 capsule, Rfl: 1  risperiDONE (RISPERDAL) 0.5 MG tablet, TAKE ONE TABLET BY MOUTH EVERY EVENING, Disp: 90 tablet, Rfl: 0  sertraline (ZOLOFT) 25 MG tablet, TAKE ONE TABLET BY MOUTH DAILY, Disp: 30 tablet, Rfl: 3  tamsulosin (FLOMAX) 0.4 MG capsule, TAKE ONE CAPSULE BY MOUTH DAILY, Disp: 90 capsule, Rfl: 2  fluticasone (FLONASE) 50 MCG/ACT nasal spray, SPRAY ONE SPRAY IN EACH NOSTRIL ONCE DAILY, Disp: 16 g, Rfl: 4  esomeprazole (NEXIUM) 20 MG delayed release capsule, Take 20 mg by mouth every morning (before breakfast), Disp: , Rfl:   Misc. Devices (WHEELCHAIR) MISC, ELECTRIC WHEELCHAIR, Disp: 1 each, Rfl: 0  Elastic Bandages & Supports (WRIST SPLINT) MISC, As directed, Disp: 1 each, Rfl: 0  docusate sodium (COLACE) 100 MG capsule, Take 1 capsule by mouth 2 times daily as needed for Constipation, Disp: 30 capsule, Rfl: 0    No current facility-administered medications for this visit.       ---------------------------               09/22/20                      1343         ---------------------------   BP:          110/78         Site:    Left Upper Arm     Position:     Sitting        Cuff Size:  Medium Adult      Pulse:         92           Temp:   97.2 °F (36.2 °C)   TempSrc:      Oral          Weight: 180 lb (81.6 kg)    Height:  5' 9\" (1.753 m)   ---------------------------  Body mass index is 26.58 kg/m².      Wt Readings from Last 3 Encounters:  09/22/20 : 180 lb (81.6 kg)  06/03/17 : 190 lb (86.2 kg)  08/13/15 : 185 lb (83.9 kg)    BP Readings from Last 3 Encounters:  09/22/20 : 110/78  10/31/19 : 128/84  12/11/18 : 122/80            Review of Systems    Objective:   Physical Exam  Constitutional:       General: He is not in acute distress. Appearance: Normal appearance. He is well-developed. He is not ill-appearing or diaphoretic. Eyes:      General: No scleral icterus. Neck:      Musculoskeletal: Neck supple. Thyroid: No thyroid mass or thyromegaly. Cardiovascular:      Rate and Rhythm: Normal rate and regular rhythm. Heart sounds: Normal heart sounds. No murmur. No friction rub. No gallop. Pulmonary:      Effort: Pulmonary effort is normal. No tachypnea, accessory muscle usage or respiratory distress. Breath sounds: Normal breath sounds. No decreased breath sounds, wheezing, rhonchi or rales. Lymphadenopathy:      Cervical: No cervical adenopathy. Upper Body:      Right upper body: No supraclavicular adenopathy. Left upper body: No supraclavicular adenopathy. Skin:     General: Skin is warm and dry. Coloration: Skin is not pale. Neurological:      Mental Status: He is alert. Comments: Right side spastic weakness   In wheelchair   Psychiatric:         Attention and Perception: Attention normal.         Assessment:       Diagnosis Orders   1. Hyperlipidemia, mixed  Comprehensive Metabolic Panel    TSH without Reflex    Cholesterol, Total   2. Spastic hemiplegia of right dominant side as late effect of other cerebrovascular disease ([facility] [address]     3. Need for influenza vaccination  INFLUENZA, QUADV, 3 YRS AND OLDER, IM PF, PREFILL SYR OR SDV, 0.5ML (AFLURIA QUADV, PF)   4. Acquired hypothyroidism  TSH without Reflex   5.  Urine frequency  POCT Urinalysis no Micro           Plan:      Flu shot today  Blood testing   Continue all the same medications  See 6 month        Marlon Rodriguez MD

## 2020-09-22 NOTE — PROGRESS NOTES
Vaccine Information Sheet, \"Influenza - Inactivated\"  given to Jacques Leo, or parent/legal guardian of  Jacques Leo and verbalized understanding. Patient responses:    Have you ever had a reaction to a flu vaccine? No  Do you have any current illness? No  Have you ever had Guillian [place] Syndrome? No  Do you have a serious allergy to any of the follow: Neomycin, Polymyxin, Thimerosal, eggs or egg products? No    Flu vaccine given per order. Please see immunization tab. Risks and benefits explained. Current VIS given.

## 2020-10-14 RX ORDER — SERTRALINE HYDROCHLORIDE 25 MG/1
TABLET, FILM COATED ORAL
Qty: 90 TABLET | Refills: 2 | Status: SHIPPED | OUTPATIENT
Start: 2020-10-14

## 2020-10-20 ENCOUNTER — TELEPHONE (OUTPATIENT)
Dept: FAMILY MEDICINE CLINIC | Age: 50
End: 2020-10-20
Payer: MEDICARE

## 2020-10-20 PROCEDURE — G0179 MD RECERTIFICATION HHA PT: HCPCS | Performed by: FAMILY MEDICINE

## 2020-11-06 ENCOUNTER — TELEPHONE (OUTPATIENT)
Dept: FAMILY MEDICINE CLINIC | Age: 50
End: 2020-11-06

## 2020-11-06 PROCEDURE — G0179 MD RECERTIFICATION HHA PT: HCPCS | Performed by: FAMILY MEDICINE

## 2020-11-24 ENCOUNTER — TELEPHONE (OUTPATIENT)
Dept: FAMILY MEDICINE CLINIC | Age: 50
End: 2020-11-24

## 2020-11-24 LAB
BILIRUBIN URINE: NEGATIVE
BLOOD, URINE: NEGATIVE
CLARITY: CLEAR
COLOR: YELLOW
EPITHELIAL CELLS, UA: 1 /HPF (ref 0–5)
GLUCOSE URINE: NEGATIVE MG/DL
HYALINE CASTS: 4 /LPF (ref 0–8)
KETONES, URINE: NEGATIVE MG/DL
LEUKOCYTE ESTERASE, URINE: NEGATIVE
MICROSCOPIC EXAMINATION: YES
NITRITE, URINE: NEGATIVE
PH UA: 5.5 (ref 5–8)
PROTEIN UA: 30 MG/DL
RBC UA: 3 /HPF (ref 0–4)
SPECIFIC GRAVITY UA: >1.03 (ref 1–1.03)
URINE TYPE: ABNORMAL
UROBILINOGEN, URINE: 0.2 E.U./DL
WBC UA: 1 /HPF (ref 0–5)

## 2020-11-24 NOTE — TELEPHONE ENCOUNTER
Mendez states that she spoke with Denzel's mom and Ruthie Go complains of terrible back pain and doesn't want to move. He also has a hard time urinating. So Physical Therapist dropped off urine sample here. Please advise.

## 2020-11-24 NOTE — TELEPHONE ENCOUNTER
[phone] Margaret Link advised and verbalized understanding. He states he will let the patient's family know.

## 2020-11-24 NOTE — TELEPHONE ENCOUNTER
Received urine sample from Caregiver/Family. [phone] - Spoke with Ihsan Villaseñor (caregiver) to see why the urine sample was dropped off. She will find out and return call.

## 2020-11-25 LAB — URINE CULTURE, ROUTINE: NORMAL

## 2020-12-11 ENCOUNTER — TELEPHONE (OUTPATIENT)
Dept: FAMILY MEDICINE CLINIC | Age: 50
End: 2020-12-11

## 2020-12-11 PROCEDURE — G0179 MD RECERTIFICATION HHA PT: HCPCS | Performed by: FAMILY MEDICINE

## 2021-03-15 ENCOUNTER — OFFICE VISIT (OUTPATIENT)
Dept: FAMILY MEDICINE CLINIC | Age: 51
End: 2021-03-15
Payer: MEDICARE

## 2021-03-15 VITALS
WEIGHT: 180 LBS | SYSTOLIC BLOOD PRESSURE: 124 MMHG | TEMPERATURE: 97.1 F | HEART RATE: 72 BPM | HEIGHT: 69 IN | BODY MASS INDEX: 26.66 KG/M2 | DIASTOLIC BLOOD PRESSURE: 80 MMHG

## 2021-03-15 DIAGNOSIS — I69.851 SPASTIC HEMIPLEGIA OF RIGHT DOMINANT SIDE AS LATE EFFECT OF OTHER CEREBROVASCULAR DISEASE (HCC): ICD-10-CM

## 2021-03-15 DIAGNOSIS — Z02.2 ENCOUNTER FOR EXAMINATION FOR ADMISSION TO NURSING HOME: Primary | ICD-10-CM

## 2021-03-15 DIAGNOSIS — Z87.820 PERSONAL HISTORY OF TRAUMATIC BRAIN INJURY: ICD-10-CM

## 2021-03-15 DIAGNOSIS — G81.11 SPASTIC HEMIPLEGIA OF RIGHT DOMINANT SIDE DUE TO CEREBROVASCULAR DISEASE (HCC): ICD-10-CM

## 2021-03-15 DIAGNOSIS — R35.0 URINE FREQUENCY: ICD-10-CM

## 2021-03-15 DIAGNOSIS — I67.9 SPASTIC HEMIPLEGIA OF RIGHT DOMINANT SIDE DUE TO CEREBROVASCULAR DISEASE (HCC): ICD-10-CM

## 2021-03-15 PROCEDURE — G8427 DOCREV CUR MEDS BY ELIG CLIN: HCPCS | Performed by: FAMILY MEDICINE

## 2021-03-15 PROCEDURE — 1036F TOBACCO NON-USER: CPT | Performed by: FAMILY MEDICINE

## 2021-03-15 PROCEDURE — 99213 OFFICE O/P EST LOW 20 MIN: CPT | Performed by: FAMILY MEDICINE

## 2021-03-15 PROCEDURE — 3017F COLORECTAL CA SCREEN DOC REV: CPT | Performed by: FAMILY MEDICINE

## 2021-03-15 PROCEDURE — G8419 CALC BMI OUT NRM PARAM NOF/U: HCPCS | Performed by: FAMILY MEDICINE

## 2021-03-15 PROCEDURE — G8482 FLU IMMUNIZE ORDER/ADMIN: HCPCS | Performed by: FAMILY MEDICINE

## 2021-03-15 RX ORDER — TAMSULOSIN HYDROCHLORIDE 0.4 MG/1
0.4 CAPSULE ORAL DAILY
Qty: 90 CAPSULE | Refills: 2 | Status: SHIPPED | OUTPATIENT
Start: 2021-03-15

## 2021-03-15 ASSESSMENT — PATIENT HEALTH QUESTIONNAIRE - PHQ9
1. LITTLE INTEREST OR PLEASURE IN DOING THINGS: 0
SUM OF ALL RESPONSES TO PHQ QUESTIONS 1-9: 0
2. FEELING DOWN, DEPRESSED OR HOPELESS: 0

## 2021-03-15 NOTE — PROGRESS NOTES
or rales. Abdominal:      General: Bowel sounds are normal. There is no distension or abdominal bruit. Palpations: Abdomen is soft. There is no hepatomegaly, splenomegaly, mass or pulsatile mass. Tenderness: There is no abdominal tenderness. There is no guarding. Lymphadenopathy:      Head:      Right side of head: No submental or submandibular adenopathy. Left side of head: No submental or submandibular adenopathy. Cervical: No cervical adenopathy. Upper Body:      Right upper body: No supraclavicular adenopathy. Left upper body: No supraclavicular adenopathy. Skin:     General: Skin is warm and dry. Coloration: Skin is not pale. Nails: There is no clubbing. Neurological:      Mental Status: He is alert. Psychiatric:         Attention and Perception: Attention normal.         Mood and Affect: Mood normal.         Assessment:       Diagnosis Orders   1. Encounter for examination for admission to nursing home     2. Personal history of traumatic brain injury     3. Spastic hemiplegia of right dominant side as late effect of other cerebrovascular disease (HCC)     4. Spastic hemiplegia of right dominant side due to cerebrovascular disease ([facility] [address]     5. Urine frequency       Orders Placed This Encounter   Medications    tamsulosin (FLOMAX) 0.4 MG capsule     Sig: Take 1 capsule by mouth daily     Dispense:  90 capsule     Refill:  2       Hx is limited  His brother is here with him to help with the current status of patient and history   He had normal blood work last visit   meds reviewed. he has not used dantrium and flomax for a while. he ran out  We will just start the flomax and rest will remain the same   Will not fill the dantrium to see if indeed it is needed  they will let me know    In past he had increased anxiety.  Several years ago placed on the current medicines to help with that and has done well   He has been through physical therapy extensively and has had neuro consultation recently as well.  Initial injury is now remote and over 25 years ago   His level of judgement is diminished      Plan:      Do get the [facility] [address] for the nursing facility   Hold the dantrolene for now and let me know if the muscle spasms are a concern  See me in 6 months         Jose Manuel Flaherty MD

## 2021-03-15 NOTE — PATIENT INSTRUCTIONS
Do get the [facility] [address] for the nursing facility   Hold the dantrolene for now and let me know if the muscle spasms are a concern  See me in 6 months

## 2021-03-20 ENCOUNTER — IMMUNIZATION (OUTPATIENT)
Dept: PRIMARY CARE CLINIC | Age: 51
End: 2021-03-20
Payer: MEDICARE

## 2021-03-20 PROCEDURE — 91303 COVID-19, J&J VACCINE, PF, 0.5 ML DOSE: CPT | Performed by: FAMILY MEDICINE

## 2021-03-20 PROCEDURE — 0031A COVID-19, J&J VACCINE, PF, 0.5 ML DOSE: CPT | Performed by: FAMILY MEDICINE

## 2021-04-06 ENCOUNTER — TELEPHONE (OUTPATIENT)
Dept: FAMILY MEDICINE CLINIC | Age: 51
End: 2021-04-06

## 2021-04-06 NOTE — TELEPHONE ENCOUNTER
Krystle Roland called back and stated that pt is being admitted today by 10  Am. She is needing the following info as soon as possible.

## 2021-04-06 NOTE — TELEPHONE ENCOUNTER
Duc Stoll w/ [facility] is needing     Office notes from March 15, 2021     Why on meds - diagnoses   What time does pt need to take the meds.      Pt is being admitted today     Duc Stoll - phone #  [phone]  Fax # [phone]

## 2022-03-03 NOTE — TELEPHONE ENCOUNTER
Faxed OT/PT orders to [facility] [phone].    [phone] - Left msg for Caregiver to return call. (Dr. Mardeen Sever [phone]). Not applicable

## 2023-08-19 NOTE — PROGRESS NOTES
Subjective:      Patient ID: Irina Becerra is a 53 y.o. male. Patient presents with:  Check-Up: Home Care    Here for the above   Really ok and stable  But he falling back with physical capabilites  Hx is from his mother as patient is not capable of detailed info  And speech is at times hard to understand    getting stiff and needs therapy  Balance is poor   He does have help that comes in freqently daily on  m-f  buths is not able to assist much with transfers especially  Mother  wants to keep out of nh as long as possible  Her advanced aged and health prevent her caring for son   No recent fall  Some effort to get about but his electric chair does help    Appetite is ok no c/o pain  No uri sx. No fever   The rash did clear up   bm and urine ok with out sx.  still with urine frequency no change. Other ros is negative     YOB: 1970    Date of Visit:  6/9/2020    No Known Allergies    Current Outpatient Medications:  risperiDONE (RISPERDAL) 0.5 MG tablet, TAKE ONE TABLET BY MOUTH EVERY EVENING, Disp: 90 tablet, Rfl: 0  tamsulosin (FLOMAX) 0.4 MG capsule, TAKE ONE CAPSULE BY MOUTH DAILY, Disp: 90 capsule, Rfl: 2  sertraline (ZOLOFT) 25 MG tablet, Take 1 tablet by mouth daily, Disp: 30 tablet, Rfl: 3  fluticasone (FLONASE) 50 MCG/ACT nasal spray, SPRAY ONE SPRAY IN EACH NOSTRIL ONCE DAILY, Disp: 16 g, Rfl: 4  mirabegron (MYRBETRIQ) 25 MG TB24, Take 25 mg by mouth daily, Disp: , Rfl:   esomeprazole (NEXIUM) 20 MG delayed release capsule, Take 20 mg by mouth every morning (before breakfast), Disp: , Rfl:   dantrolene (DANTRIUM) 25 MG capsule, Take 25 mg by mouth, Disp: , Rfl:   Misc.  Devices (WHEELCHAIR) MISC, ELECTRIC WHEELCHAIR, Disp: 1 each, Rfl: 0  Elastic Bandages & Supports (WRIST SPLINT) MISC, As directed, Disp: 1 each, Rfl: 0  docusate sodium (COLACE) 100 MG capsule, Take 1 capsule by mouth 2 times daily as needed for Constipation, Disp: 30 capsule, Rfl: 0    No current facility-administered medications for this visit. There were no vitals filed for this visit. There is no height or weight on file to calculate BMI. Wt Readings from Last 3 Encounters:  06/03/17 : 190 lb (86.2 kg)  08/13/15 : 185 lb (83.9 kg)  05/07/15 : 185 lb (83.9 kg)    BP Readings from Last 3 Encounters:  10/31/19 : 128/84  12/11/18 : 122/80  11/30/18 : 118/64            Review of Systems    Objective:   Physical Exam  Constitutional:       General: He is not in acute distress. Appearance: He is not ill-appearing. Neurological:      Mental Status: He is alert. Psychiatric:      Comments: Impaired speech due to hx of brain trauma         Assessment:        Diagnosis Orders   1. Spastic hemiplegia of right dominant side due to cerebrovascular disease ([facility] [address]     2. Personal history of traumatic brain injury         Stable but sequela of disease  He needs to have therapeutic intervention to increase mobility and self care and transfer  Home health and pt evaluate and treat  erica is  being evaluated by a Virtual Visit (video visit) encounter to address concerns as mentioned above. A caregiver was present when appropriate. Due to this being a TeleHealth encounter ([facility]), evaluation of the following organ systems was limited: Vitals/Constitutional/EENT/Resp/CV/GI//MS/Neuro/Skin/Heme-Lymph-Imm. Pursuant to the emergency declaration under the [facility], [address] [place] [address] and the [employer] Act, this Virtual Visit was conducted with patient's (and/or legal guardian's) consent, to reduce the patient's risk of exposure to COVID-19 and provide necessary medical care. The patient (and/or legal guardian) has also been advised to contact this office for worsening conditions or problems, and seek emergency medical treatment and/or call 911 if deemed necessary.        Services were provided through a video synchronous discussion virtually to substitute for in-person clinic visit. Patient and provider were located at their individual homes.            Plan:      See 5 month  Pt/ot recommended        Dyan Cedeño MD Internal Medicine

## 2023-08-30 NOTE — PROGRESS NOTES
Ekg done by staff     Lashae Martinez  08/30/23 [phone] Systems    Objective:   Physical Exam   Constitutional: He appears well-developed and well-nourished. No distress. Abdominal: Soft. Bowel sounds are normal. He exhibits no distension and no mass. There is no tenderness. There is no guarding. Neurological: He is alert. Skin: Skin is warm, dry and intact. He is not diaphoretic. No pallor. Urticaria on the right arm near elbow and the thigh       Assessment:       Diagnosis Orders   1. Hives  Comprehensive Metabolic Panel    CBC Auto Differential     reviewed the [facility] stay and they only wanted lower dose of resperdal  Not clear why the hives at this time      Plan:       Start the steroid with food  Get the blood today  Stay off the ditropan for now  Decrease resperdal to night time only  Ok to use benadryl for any itch      Orders Placed This Encounter   Medications    predniSONE (DELTASONE) 10 MG tablet     Sig: Prednisone 10 mg. #16. Take 2 po bid for 2 days, then 1 po bid for 3 days,  then 1 po daily for 2 days.  Then stop     Dispense:  16 tablet     Refill:  0    risperiDONE (RISPERDAL) 0.5 MG tablet     Sig: Take 1 tablet by mouth every evening     Dispense:  180 tablet     Refill:  0       Rosalia Chandra MD